# Patient Record
Sex: MALE | Race: WHITE | NOT HISPANIC OR LATINO | URBAN - METROPOLITAN AREA
[De-identification: names, ages, dates, MRNs, and addresses within clinical notes are randomized per-mention and may not be internally consistent; named-entity substitution may affect disease eponyms.]

---

## 2018-06-11 LAB — CRC RECOMMENDATION EXT: NORMAL

## 2021-04-16 ENCOUNTER — HISTORICAL (OUTPATIENT)
Dept: ADMINISTRATIVE | Facility: HOSPITAL | Age: 56
End: 2021-04-16

## 2022-04-07 ENCOUNTER — HISTORICAL (OUTPATIENT)
Dept: ADMINISTRATIVE | Facility: HOSPITAL | Age: 57
End: 2022-04-07

## 2022-04-24 VITALS
BODY MASS INDEX: 31.31 KG/M2 | SYSTOLIC BLOOD PRESSURE: 150 MMHG | OXYGEN SATURATION: 98 % | WEIGHT: 199.5 LBS | HEIGHT: 67 IN | DIASTOLIC BLOOD PRESSURE: 94 MMHG

## 2022-06-02 ENCOUNTER — TELEPHONE (OUTPATIENT)
Dept: ADMINISTRATIVE | Facility: HOSPITAL | Age: 57
End: 2022-06-02

## 2022-06-02 DIAGNOSIS — I10 HYPERTENSION, UNSPECIFIED TYPE: Primary | ICD-10-CM

## 2022-06-02 DIAGNOSIS — E11.9 TYPE 2 DIABETES MELLITUS WITHOUT COMPLICATION, WITHOUT LONG-TERM CURRENT USE OF INSULIN: ICD-10-CM

## 2022-06-02 NOTE — TELEPHONE ENCOUNTER
Tried to call patient to get more information. With mpore research from Javier, patient does have CMP, A1C and lipid panel that needs to get done before appt on 06/15/22. I just need to know hwy he needs a testosterone level needs to be added.

## 2022-06-02 NOTE — TELEPHONE ENCOUNTER
Type:  Patient Requesting Referral    Who Called:wife  Does the patient already have the specialty appointment scheduled?:no  If yes, what is the date of that appointment?:no  Referral to What Specialty:endrocrinologist   Reason for Referral:na  Does the patient want the referral with a specific physician?:Dr.Justin Mendez  Is the specialist an [facility]na  Patient Requesting a Response?:yes  Would the patient rather a call back or a response via [facility]? Call back  Best Call Back Number:[phone]  Additional Information: need lab orders placed for testosterone

## 2022-06-09 ENCOUNTER — TELEPHONE (OUTPATIENT)
Dept: PRIMARY CARE CLINIC | Facility: CLINIC | Age: 57
End: 2022-06-09

## 2022-06-09 NOTE — TELEPHONE ENCOUNTER
----- Message from Carly Tyson sent at 6/9/2022 10:45 AM CDT -----  Regarding: Referral and Labs  Patients wife left message with Call center stating she has not gotten a call from us regarding her 's testosterone results.     And also has questions about and endocrinologist referral.    [phone]

## 2022-06-10 ENCOUNTER — TELEPHONE (OUTPATIENT)
Dept: ADMINISTRATIVE | Facility: HOSPITAL | Age: 57
End: 2022-06-10

## 2022-06-15 ENCOUNTER — OFFICE VISIT (OUTPATIENT)
Dept: PRIMARY CARE CLINIC | Facility: CLINIC | Age: 57
End: 2022-06-15

## 2022-06-15 DIAGNOSIS — E78.2 MIXED HYPERLIPIDEMIA: Chronic | ICD-10-CM

## 2022-06-15 DIAGNOSIS — I10 PRIMARY HYPERTENSION: ICD-10-CM

## 2022-06-15 DIAGNOSIS — R53.82 CHRONIC FATIGUE: Chronic | ICD-10-CM

## 2022-06-15 DIAGNOSIS — R03.0 ELEVATED BLOOD PRESSURE READING WITHOUT DIAGNOSIS OF HYPERTENSION: Chronic | ICD-10-CM

## 2022-06-15 DIAGNOSIS — F32.A DEPRESSION, UNSPECIFIED DEPRESSION TYPE: ICD-10-CM

## 2022-06-15 DIAGNOSIS — E11.9 TYPE 2 DIABETES MELLITUS WITHOUT COMPLICATION, WITHOUT LONG-TERM CURRENT USE OF INSULIN: Primary | ICD-10-CM

## 2022-06-15 PROBLEM — R20.2 PARESTHESIA OF UPPER EXTREMITY: Chronic | Status: ACTIVE | Noted: 2022-06-15

## 2022-06-15 PROBLEM — J30.2 SEASONAL ALLERGIES: Status: ACTIVE | Noted: 2022-06-15

## 2022-06-15 PROBLEM — F52.0 DECREASED SEXUAL DESIRE: Status: ACTIVE | Noted: 2021-08-10

## 2022-06-15 PROBLEM — M25.512 PAIN RADIATING TO LEFT SHOULDER: Status: ACTIVE | Noted: 2022-06-15

## 2022-06-15 PROBLEM — E66.9 OBESITY: Chronic | Status: ACTIVE | Noted: 2022-06-15

## 2022-06-15 PROBLEM — R53.83 FATIGUE: Status: ACTIVE | Noted: 2021-08-10

## 2022-06-15 PROBLEM — E66.9 OBESITY: Status: ACTIVE | Noted: 2022-06-15

## 2022-06-15 PROBLEM — R94.31: Status: ACTIVE | Noted: 2022-06-15

## 2022-06-15 PROBLEM — J30.2 SEASONAL ALLERGIES: Chronic | Status: ACTIVE | Noted: 2022-06-15

## 2022-06-15 PROBLEM — R94.31: Chronic | Status: ACTIVE | Noted: 2022-06-15

## 2022-06-15 PROBLEM — M25.512 PAIN RADIATING TO LEFT SHOULDER: Chronic | Status: ACTIVE | Noted: 2022-06-15

## 2022-06-15 PROBLEM — E78.5 HYPERLIPIDEMIA: Chronic | Status: ACTIVE | Noted: 2022-06-15

## 2022-06-15 PROBLEM — R20.2 PARESTHESIA OF UPPER EXTREMITY: Status: ACTIVE | Noted: 2022-06-15

## 2022-06-15 PROBLEM — E78.5 HYPERLIPIDEMIA: Status: ACTIVE | Noted: 2022-06-15

## 2022-06-15 PROBLEM — F52.0 DECREASED SEXUAL DESIRE: Chronic | Status: ACTIVE | Noted: 2021-08-10

## 2022-06-15 PROBLEM — R53.83 FATIGUE: Chronic | Status: ACTIVE | Noted: 2021-08-10

## 2022-06-15 PROCEDURE — 1159F MED LIST DOCD IN RCRD: CPT | Mod: CPTII,95,, | Performed by: GENERAL PRACTICE

## 2022-06-15 PROCEDURE — 4010F PR ACE/ARB THEARPY RXD/TAKEN: ICD-10-PCS | Mod: CPTII,95,, | Performed by: GENERAL PRACTICE

## 2022-06-15 PROCEDURE — 1160F RVW MEDS BY RX/DR IN RCRD: CPT | Mod: CPTII,95,, | Performed by: GENERAL PRACTICE

## 2022-06-15 PROCEDURE — 1160F PR REVIEW ALL MEDS BY PRESCRIBER/CLIN PHARMACIST DOCUMENTED: ICD-10-PCS | Mod: CPTII,95,, | Performed by: GENERAL PRACTICE

## 2022-06-15 PROCEDURE — 1159F PR MEDICATION LIST DOCUMENTED IN MEDICAL RECORD: ICD-10-PCS | Mod: CPTII,95,, | Performed by: GENERAL PRACTICE

## 2022-06-15 PROCEDURE — 4010F ACE/ARB THERAPY RXD/TAKEN: CPT | Mod: CPTII,95,, | Performed by: GENERAL PRACTICE

## 2022-06-15 PROCEDURE — 99214 OFFICE O/P EST MOD 30 MIN: CPT | Mod: 95,,, | Performed by: GENERAL PRACTICE

## 2022-06-15 PROCEDURE — 99214 PR OFFICE/OUTPT VISIT, EST, LEVL IV, 30-39 MIN: ICD-10-PCS | Mod: 95,,, | Performed by: GENERAL PRACTICE

## 2022-06-15 RX ORDER — LOSARTAN POTASSIUM 50 MG/1
TABLET ORAL
COMMUNITY
Start: 2022-03-16 | End: 2022-12-13 | Stop reason: SDUPTHER

## 2022-06-15 RX ORDER — ZINC GLUCONATE 50 MG
50 TABLET ORAL DAILY
COMMUNITY
Start: 2022-03-16 | End: 2023-03-14

## 2022-06-15 RX ORDER — DESVENLAFAXINE SUCCINATE 50 MG/1
50 TABLET, EXTENDED RELEASE ORAL DAILY
Qty: 30 TABLET | Refills: 11 | Status: SHIPPED | OUTPATIENT
Start: 2022-06-15 | End: 2022-12-13

## 2022-06-15 RX ORDER — METFORMIN HYDROCHLORIDE 500 MG/1
500 TABLET ORAL DAILY
COMMUNITY
Start: 2022-06-15 | End: 2022-12-13 | Stop reason: SDUPTHER

## 2022-06-15 RX ORDER — ROSUVASTATIN CALCIUM 10 MG/1
10 TABLET, COATED ORAL NIGHTLY
COMMUNITY
Start: 2022-06-15 | End: 2022-12-13 | Stop reason: SDUPTHER

## 2022-06-15 NOTE — ASSESSMENT & PLAN NOTE
Patient is taking losartan, take blood pressures at home, record those blood pressures for evaluation later.

## 2022-06-15 NOTE — ASSESSMENT & PLAN NOTE
Patient did do a lipid panel, those results are not available, they will be retrieved and evaluated.

## 2022-06-15 NOTE — PROGRESS NOTES
Subjective:       Patient ID: Damian Ta is a 56 y.o. male.    Chief Complaint: No chief complaint on file.    HPI   Patient presents to the clinic today for chronic condition follow-up.  Doing well, no specific complaints, tolerating all medications, reporting no significant side effects or problems.  Patient discovered to be diabetic approximately 3 months ago when he had an A1c of 7.8% on a routine wellness exam.  Started on metformin then.  Also had a history of elevated blood pressure, without diagnosis of HTN, and hyperlipidemia.  Also started on rosuvastatin, with a total cholesterol of 250, triglycerides of 202, and LDL of 159. Notably, he did have blood tests done, no results are available.  He is taking losartan for his blood pressure, rosuvastatin for his cholesterol, and metformin for his diabetes.  He is having some fatigue, and only a, decreased sex drive, sometimes gets irritable for very small things.  Review of Systems   Constitutional: Negative.  Negative for fatigue and fever.   HENT: Negative.  Negative for sore throat and trouble swallowing.    Eyes: Negative.  Negative for redness and visual disturbance.   Respiratory: Negative.  Negative for chest tightness and shortness of breath.    Cardiovascular: Negative.  Negative for chest pain and palpitations.   Gastrointestinal: Negative.  Negative for abdominal pain, blood in stool and nausea.   Endocrine: Negative.  Negative for cold intolerance, heat intolerance and polyuria.   Genitourinary: Negative.    Musculoskeletal: Negative.  Negative for arthralgias, gait problem, joint swelling and neck pain.   Integumentary:  Negative for rash. Negative.   Neurological: Negative.  Negative for dizziness, weakness and headaches.   Psychiatric/Behavioral: Negative.  Negative for agitation and dysphoric mood.         Objective:   There were no vitals filed for this visit.There is no height or weight on file to calculate BMI.     Physical  Exam  Constitutional:       Appearance: Normal appearance.   Eyes:      Conjunctiva/sclera: Conjunctivae normal.   Cardiovascular:      Rate and Rhythm: Normal rate and regular rhythm.   Pulmonary:      Effort: Pulmonary effort is normal.      Breath sounds: Normal breath sounds.   Skin:     General: Skin is warm and dry.   Neurological:      Mental Status: He is alert.   Psychiatric:         Mood and Affect: Mood normal.         Behavior: Behavior normal.             Assessment:     Problem List Items Addressed This Visit        Psychiatric    Depression    Current Assessment & Plan     Start Pristiq 50 mg, sent to pharmacy, side effects discussed, will recheck this in 6 weeks.           Relevant Medications    desvenlafaxine succinate (PRISTIQ) 50 MG Tb24       Cardiac/Vascular    Hyperlipidemia (Chronic)    Current Assessment & Plan     Patient did do a lipid panel, those results are not available, they will be retrieved and evaluated.           RESOLVED: Elevated blood pressure reading without diagnosis of hypertension (Chronic)    HTN (hypertension)    Current Assessment & Plan     Patient is taking losartan, take blood pressures at home, record those blood pressures for evaluation later.              Endocrine    Type 2 diabetes mellitus without complication, without long-term current use of insulin - Primary    Current Assessment & Plan     Patient did do an A1c level, we will retrieve those results, continue metformin.           Relevant Medications    metFORMIN (GLUCOPHAGE) 500 MG tablet       Other    Fatigue (Chronic)    Current Assessment & Plan     Testosterone level will be ordered, results will be monitored.           Relevant Orders    Testosterone             Medication List with Changes/Refills   New Medications    DESVENLAFAXINE SUCCINATE (PRISTIQ) 50 MG TB24    Take 1 tablet (50 mg total) by mouth once daily.   Current Medications    LOSARTAN (COZAAR) 50 MG TABLET        METFORMIN (GLUCOPHAGE)  500 MG TABLET    Take 500 mg by mouth once daily.    ROSUVASTATIN (CRESTOR) 10 MG TABLET    Take 10 mg by mouth nightly.    ZINC GLUCONATE 50 MG TABLET    Take 50 mg by mouth once daily.     Plan:           Start Pristiq, get testosterone level done, check blood pressures regularly.  Blood test results will be monitored and reported when retrieved.  Follow up in about 6 weeks (around 7/27/2022).

## 2022-07-24 PROBLEM — E11.9 TYPE 2 DIABETES MELLITUS WITHOUT COMPLICATION, WITHOUT LONG-TERM CURRENT USE OF INSULIN: Chronic | Status: ACTIVE | Noted: 2022-06-15

## 2022-07-25 LAB — TESTOST SERPL-MCNC: 373 NG/DL (ref 264–916)

## 2022-09-05 PROBLEM — I10 PRIMARY HYPERTENSION: Chronic | Status: ACTIVE | Noted: 2022-06-15

## 2022-09-05 PROBLEM — F32.A DEPRESSION: Chronic | Status: ACTIVE | Noted: 2022-06-15

## 2022-11-07 ENCOUNTER — TELEPHONE (OUTPATIENT)
Dept: PRIMARY CARE CLINIC | Facility: CLINIC | Age: 57
End: 2022-11-07
Payer: COMMERCIAL

## 2022-11-07 NOTE — TELEPHONE ENCOUNTER
----- Message from Luciana Bryant LPN sent at 11/6/2022  1:07 PM CST -----  Regarding: FW: follow up    ----- Message -----  From: Keri Raman  Sent: 11/4/2022   2:44 PM CST  To: [facility] Lara Staff  Subject: follow up                                        Type:  Needs Medical Advice    Who Called: pt   Symptoms (please be specific):    How long has patient had these symptoms:    Pharmacy name and phone #:    Would the patient rather a call back or a response via [facility]? Call back  Best Call Back Number: [phone]  Additional Information: pt wanted to make a follow up appt.  He mention he has to cancel his past appt.  He wanted to know if he can be squeezed in for an earlier appt

## 2022-12-13 ENCOUNTER — OFFICE VISIT (OUTPATIENT)
Dept: PRIMARY CARE CLINIC | Facility: CLINIC | Age: 57
End: 2022-12-13
Payer: COMMERCIAL

## 2022-12-13 VITALS
RESPIRATION RATE: 18 BRPM | OXYGEN SATURATION: 97 % | HEART RATE: 90 BPM | BODY MASS INDEX: 31.08 KG/M2 | SYSTOLIC BLOOD PRESSURE: 148 MMHG | WEIGHT: 198 LBS | HEIGHT: 67 IN | DIASTOLIC BLOOD PRESSURE: 98 MMHG

## 2022-12-13 DIAGNOSIS — E11.9 TYPE 2 DIABETES MELLITUS WITHOUT COMPLICATION, WITHOUT LONG-TERM CURRENT USE OF INSULIN: Chronic | ICD-10-CM

## 2022-12-13 DIAGNOSIS — E78.5 DYSLIPIDEMIA: Chronic | ICD-10-CM

## 2022-12-13 DIAGNOSIS — E11.9 TYPE 2 DIABETES MELLITUS WITHOUT COMPLICATION, WITHOUT LONG-TERM CURRENT USE OF INSULIN: ICD-10-CM

## 2022-12-13 DIAGNOSIS — I10 HYPERTENSION, UNSPECIFIED TYPE: ICD-10-CM

## 2022-12-13 DIAGNOSIS — I10 PRIMARY HYPERTENSION: ICD-10-CM

## 2022-12-13 DIAGNOSIS — I10 PRIMARY HYPERTENSION: Primary | Chronic | ICD-10-CM

## 2022-12-13 DIAGNOSIS — F41.1 GAD (GENERALIZED ANXIETY DISORDER): Chronic | ICD-10-CM

## 2022-12-13 DIAGNOSIS — E78.5 DYSLIPIDEMIA: Primary | ICD-10-CM

## 2022-12-13 LAB
EST. AVERAGE GLUCOSE BLD GHB EST-MCNC: 142.7 MG/DL
HBA1C MFR BLD: 6.6 %

## 2022-12-13 PROCEDURE — 3080F PR MOST RECENT DIASTOLIC BLOOD PRESSURE >= 90 MM HG: ICD-10-PCS | Mod: CPTII,,, | Performed by: GENERAL PRACTICE

## 2022-12-13 PROCEDURE — 3077F SYST BP >= 140 MM HG: CPT | Mod: CPTII,,, | Performed by: GENERAL PRACTICE

## 2022-12-13 PROCEDURE — 1160F RVW MEDS BY RX/DR IN RCRD: CPT | Mod: CPTII,,, | Performed by: GENERAL PRACTICE

## 2022-12-13 PROCEDURE — 3080F DIAST BP >= 90 MM HG: CPT | Mod: CPTII,,, | Performed by: GENERAL PRACTICE

## 2022-12-13 PROCEDURE — 4010F PR ACE/ARB THEARPY RXD/TAKEN: ICD-10-PCS | Mod: CPTII,,, | Performed by: GENERAL PRACTICE

## 2022-12-13 PROCEDURE — 99213 OFFICE O/P EST LOW 20 MIN: CPT | Mod: ,,, | Performed by: GENERAL PRACTICE

## 2022-12-13 PROCEDURE — 3008F PR BODY MASS INDEX (BMI) DOCUMENTED: ICD-10-PCS | Mod: CPTII,,, | Performed by: GENERAL PRACTICE

## 2022-12-13 PROCEDURE — 4010F ACE/ARB THERAPY RXD/TAKEN: CPT | Mod: CPTII,,, | Performed by: GENERAL PRACTICE

## 2022-12-13 PROCEDURE — 36415 COLL VENOUS BLD VENIPUNCTURE: CPT | Performed by: GENERAL PRACTICE

## 2022-12-13 PROCEDURE — 1159F PR MEDICATION LIST DOCUMENTED IN MEDICAL RECORD: ICD-10-PCS | Mod: CPTII,,, | Performed by: GENERAL PRACTICE

## 2022-12-13 PROCEDURE — 99213 PR OFFICE/OUTPT VISIT, EST, LEVL III, 20-29 MIN: ICD-10-PCS | Mod: ,,, | Performed by: GENERAL PRACTICE

## 2022-12-13 PROCEDURE — 83036 HEMOGLOBIN GLYCOSYLATED A1C: CPT | Performed by: GENERAL PRACTICE

## 2022-12-13 PROCEDURE — 3008F BODY MASS INDEX DOCD: CPT | Mod: CPTII,,, | Performed by: GENERAL PRACTICE

## 2022-12-13 PROCEDURE — 3077F PR MOST RECENT SYSTOLIC BLOOD PRESSURE >= 140 MM HG: ICD-10-PCS | Mod: CPTII,,, | Performed by: GENERAL PRACTICE

## 2022-12-13 PROCEDURE — 36415 PR COLLECTION VENOUS BLOOD,VENIPUNCTURE: ICD-10-PCS | Mod: ,,, | Performed by: GENERAL PRACTICE

## 2022-12-13 PROCEDURE — 36415 COLL VENOUS BLD VENIPUNCTURE: CPT | Mod: ,,, | Performed by: GENERAL PRACTICE

## 2022-12-13 PROCEDURE — 1159F MED LIST DOCD IN RCRD: CPT | Mod: CPTII,,, | Performed by: GENERAL PRACTICE

## 2022-12-13 PROCEDURE — 1160F PR REVIEW ALL MEDS BY PRESCRIBER/CLIN PHARMACIST DOCUMENTED: ICD-10-PCS | Mod: CPTII,,, | Performed by: GENERAL PRACTICE

## 2022-12-13 RX ORDER — BUPROPION HYDROCHLORIDE 150 MG/1
150 TABLET ORAL DAILY
Qty: 30 TABLET | Refills: 11 | Status: SHIPPED | OUTPATIENT
Start: 2022-12-13 | End: 2022-12-13

## 2022-12-13 NOTE — PROGRESS NOTES
"Subjective:       Patient ID: Damian Ta is a 57 y.o. male.    Chief Complaint: Follow-up, Anxiety, and Hypertension    Established patient, presents for follow-up, last visit June 2022, presents for follow-up.  Still having some anxiety, could not tolerate the Pristiq, caused what felt like some urinary obstruction.  Would like to try something else, has been on Lexapro before, cause the same type of side effect.  Also, not sure about his diabetes, last A1c approximately six months ago was.  7.7%.    Review of Systems   Constitutional: Negative.  Negative for fatigue and fever.   Respiratory: Negative.  Negative for shortness of breath.    Cardiovascular: Negative.  Negative for chest pain.   Gastrointestinal: Negative.  Negative for abdominal pain, change in bowel habit and change in bowel habit.   Integumentary:  Negative.   Neurological:  Negative for weakness.       Objective:     Vitals:    12/13/22 1540   BP: (!) 148/98   Pulse: 90   Resp: 18   SpO2: 97%   Weight: 89.8 kg (198 lb)   Height: 5' 7" (1.702 m)   Body mass index is 31.01 kg/m².     Physical Exam  Constitutional:       Appearance: Normal appearance.   Eyes:      Conjunctiva/sclera: Conjunctivae normal.   Cardiovascular:      Rate and Rhythm: Normal rate and regular rhythm.   Pulmonary:      Effort: Pulmonary effort is normal.      Breath sounds: Normal breath sounds.   Skin:     General: Skin is warm and dry.   Neurological:      Mental Status: He is alert.   Psychiatric:         Mood and Affect: Mood normal.         Behavior: Behavior normal.         Assessment:     Problem List Items Addressed This Visit          Psychiatric    ESTRELLITA (generalized anxiety disorder) (Chronic)    Current Assessment & Plan     Start Wellbutrin, 150 mg daily, monitor for side effects.         Relevant Medications    buPROPion (WELLBUTRIN XL) 150 MG TB24 tablet       Cardiac/Vascular    Dyslipidemia (Chronic)    Primary hypertension - Primary (Chronic)    Current " Assessment & Plan     Not sure as to control, check blood pressures at home, we will re-evaluate at next visit.            Endocrine    Type 2 diabetes mellitus without complication, without long-term current use of insulin (Chronic)    Current Assessment & Plan     A1c will be checked today, results will be monitored and reported.         Relevant Orders    Hemoglobin A1C          Medication List with Changes/Refills   New Medications    BUPROPION (WELLBUTRIN XL) 150 MG TB24 TABLET    Take 1 tablet (150 mg total) by mouth once daily.   Current Medications    LOSARTAN (COZAAR) 50 MG TABLET        METFORMIN (GLUCOPHAGE) 500 MG TABLET    Take 500 mg by mouth once daily.    ROSUVASTATIN (CRESTOR) 10 MG TABLET    Take 10 mg by mouth nightly.    ZINC GLUCONATE 50 MG TABLET    Take 50 mg by mouth once daily.   Discontinued Medications    DESVENLAFAXINE SUCCINATE (PRISTIQ) 50 MG TB24    Take 1 tablet (50 mg total) by mouth once daily.     Plan:             Follow up in about 6 months (around 6/13/2023) for Wellness.

## 2022-12-14 RX ORDER — BUPROPION HYDROCHLORIDE 150 MG/1
150 TABLET ORAL DAILY
Qty: 30 TABLET | Refills: 11 | Status: SHIPPED | OUTPATIENT
Start: 2022-12-14 | End: 2023-03-15

## 2022-12-14 RX ORDER — ROSUVASTATIN CALCIUM 10 MG/1
10 TABLET, COATED ORAL NIGHTLY
Qty: 90 TABLET | Refills: 3 | Status: SHIPPED | OUTPATIENT
Start: 2022-12-14 | End: 2022-12-15 | Stop reason: SDUPTHER

## 2022-12-14 RX ORDER — METFORMIN HYDROCHLORIDE 500 MG/1
500 TABLET ORAL DAILY
Qty: 90 TABLET | Refills: 3 | Status: SHIPPED | OUTPATIENT
Start: 2022-12-14 | End: 2023-08-10 | Stop reason: SDUPTHER

## 2022-12-14 RX ORDER — LOSARTAN POTASSIUM 50 MG/1
50 TABLET ORAL DAILY
Qty: 90 TABLET | Refills: 3 | Status: SHIPPED | OUTPATIENT
Start: 2022-12-14 | End: 2024-01-08 | Stop reason: SDUPTHER

## 2022-12-15 DIAGNOSIS — E78.5 DYSLIPIDEMIA: Primary | Chronic | ICD-10-CM

## 2022-12-15 RX ORDER — ROSUVASTATIN CALCIUM 10 MG/1
10 TABLET, COATED ORAL DAILY
Qty: 90 TABLET | Refills: 3 | Status: SHIPPED | OUTPATIENT
Start: 2022-12-15 | End: 2024-01-08 | Stop reason: SDUPTHER

## 2022-12-27 ENCOUNTER — TELEPHONE (OUTPATIENT)
Dept: PRIMARY CARE CLINIC | Facility: CLINIC | Age: 57
End: 2022-12-27
Payer: COMMERCIAL

## 2022-12-27 DIAGNOSIS — J30.2 SEASONAL ALLERGIES: Primary | Chronic | ICD-10-CM

## 2022-12-27 RX ORDER — AZELASTINE HYDROCHLORIDE, FLUTICASONE PROPIONATE 137; 50 UG/1; UG/1
1 SPRAY, METERED NASAL 2 TIMES DAILY
Qty: 23 G | Refills: 11 | Status: SHIPPED | OUTPATIENT
Start: 2022-12-27 | End: 2022-12-30 | Stop reason: CLARIF

## 2022-12-27 RX ORDER — AZELASTINE HYDROCHLORIDE, FLUTICASONE PROPIONATE 137; 50 UG/1; UG/1
1 SPRAY, METERED NASAL 2 TIMES DAILY
COMMUNITY
End: 2022-12-27 | Stop reason: SDUPTHER

## 2022-12-27 NOTE — TELEPHONE ENCOUNTER
----- Message from Bouchra Maldonado sent at 12/27/2022  1:23 PM CST -----  Regarding: refill  Type:  RX Refill Request    Who Called: pt  Refill or New Rx:refill  RX Name and Strength:azelastine nasal spray  How is the patient currently taking it? (ex. 1XDay) 1xday  Is this a 30 day or 90 day RX:  [facility] phone number[facility]  Local or Mail Order:local  Ordering Provider:lilly breaux  Would the patient rather a call back or a response via [facility]?   Best Call Back Number:[phone]  Additional Information:

## 2022-12-30 ENCOUNTER — TELEPHONE (OUTPATIENT)
Dept: PRIMARY CARE CLINIC | Facility: CLINIC | Age: 57
End: 2022-12-30
Payer: COMMERCIAL

## 2022-12-30 RX ORDER — AZELASTINE 1 MG/ML
1 SPRAY, METERED NASAL 2 TIMES DAILY
COMMUNITY
End: 2023-01-14 | Stop reason: SDUPTHER

## 2022-12-30 NOTE — TELEPHONE ENCOUNTER
----- Message from Bouchra Maldonado sent at 12/30/2022 10:35 AM CST -----  Regarding: meds  Pt called about having the wrong nasal spray sent to the pharmacy. He used to take the azelastine [facility] .1% 137mcg ([facility] [phone]

## 2023-01-14 DIAGNOSIS — J30.2 SEASONAL ALLERGIES: Primary | Chronic | ICD-10-CM

## 2023-01-14 RX ORDER — AZELASTINE 1 MG/ML
1 SPRAY, METERED NASAL 2 TIMES DAILY
Qty: 30 ML | Refills: 11 | Status: SHIPPED | OUTPATIENT
Start: 2023-01-14 | End: 2023-01-19 | Stop reason: SDUPTHER

## 2023-01-19 ENCOUNTER — TELEPHONE (OUTPATIENT)
Dept: PRIMARY CARE CLINIC | Facility: CLINIC | Age: 58
End: 2023-01-19
Payer: COMMERCIAL

## 2023-01-19 DIAGNOSIS — J30.2 SEASONAL ALLERGIES: Chronic | ICD-10-CM

## 2023-01-19 RX ORDER — AZELASTINE 1 MG/ML
1 SPRAY, METERED NASAL 2 TIMES DAILY
Qty: 30 ML | Refills: 11 | Status: SHIPPED | OUTPATIENT
Start: 2023-01-19

## 2023-01-19 NOTE — TELEPHONE ENCOUNTER
----- Message from Constance Griffin sent at 1/19/2023  8:34 AM CST -----  Regarding: med refill  .Type:  RX Refill Request    Who Called: pt  Refill or New Rx:refill  RX Name and Strength:azelastine (ASTELIN) 137 mcg (0.1 %) nasal spray  How is the patient currently taking it? (ex. 1XDay):  Is this a 30 day or 90 day RX:  Preferred Pharmacy with phone number:[id number]  [place] - [address]   Phone: [phone]  Local or Mail Order:local  Ordering Provider:Ravin  Would the patient rather a call back or a response via [facility]? Call back  Best Call Back Number:[phone]  Additional Information: pt called to have the med refilled it was sent to the wrong pharmacy

## 2023-01-23 ENCOUNTER — PATIENT MESSAGE (OUTPATIENT)
Dept: ADMINISTRATIVE | Facility: HOSPITAL | Age: 58
End: 2023-01-23
Payer: COMMERCIAL

## 2023-02-16 ENCOUNTER — PATIENT OUTREACH (OUTPATIENT)
Dept: ADMINISTRATIVE | Facility: HOSPITAL | Age: 58
End: 2023-02-16
Payer: COMMERCIAL

## 2023-02-16 NOTE — LETTER
"       AUTHORIZATION FOR RELEASE OF   CONFIDENTIAL INFORMATION    Dear Dr. Dash,    We are seeing Damian Ta, date of birth 1965, in the clinic at [facility]. Marco Alicia MD is the patient's PCP. Damian Ta has an outstanding lab/procedure at the time we reviewed his chart. In order to help keep his health information updated, he has authorized us to request the following medical record(s):        (  )  MAMMOGRAM                                      (  xx)  COLONOSCOPY      (  )  PAP SMEAR                                          (  )  OUTSIDE LAB RESULTS     (  )  DEXA SCAN                                          (  )  EYE EXAM            (  )  FOOT EXAM                                          (  )  ENTIRE RECORD     (  )  OUTSIDE IMMUNIZATIONS                 (  )  _______________         Please fax records to [facility], Pernell J Simon, MD,  [phone]        If you have any questions, please contact Alicia Perera (Connie)Care Coordinator @ [phone]       Patient Name: Damian Ta  : 1965  Patient Phone #: [phone]     "

## 2023-02-16 NOTE — PROGRESS NOTES
Population Health Outreach.    Spoke wit pt. regarding overdue DM Eye exams, states he would like to speak with Dr. Alicia prior to having an eye exam, I will  obtain Colonoscopy from 5 years ago, per pt. he had one done with , I will request those records.

## 2023-02-22 ENCOUNTER — DOCUMENTATION ONLY (OUTPATIENT)
Dept: PRIMARY CARE CLINIC | Facility: CLINIC | Age: 58
End: 2023-02-22
Payer: COMMERCIAL

## 2023-03-13 ENCOUNTER — TELEPHONE (OUTPATIENT)
Dept: PRIMARY CARE CLINIC | Facility: CLINIC | Age: 58
End: 2023-03-13
Payer: COMMERCIAL

## 2023-03-13 NOTE — TELEPHONE ENCOUNTER
----- Message from Luciana Bryant LPN sent at 3/10/2023 10:48 AM CST -----  Regarding: FW: referral  Please contact patient. An appointment will be needed for this referral, his last ov was in 12/22. Thank You  ----- Message -----  From: Bouchra Erica  Sent: 3/10/2023  10:45 AM CST  To: [facility]avin Lara Staff  Subject: referral                                         Type:  Patient Requesting Referral    Who Called:pt  Does the patient already have the specialty appointment scheduled?:  If yes, what is the date of that appointment?:  Referral to What Specialty:urologist  Reason for Referral: frequent urination, low sex drive  Does the patient want the referral with a specific physician?:  [facility] the [facility]?:  Patient Requesting a Response?:yes  Would the patient rather a call back or a response via [facility]?   Best Call Back Number:[phone]  Additional Information: pt called about needing a referral to a urologist. He stated that he constant has the need to urinate and when he does go he never feels like he's done. He also said that he is experecign low sex drive

## 2023-03-14 PROBLEM — Z82.49 FAMILY HISTORY OF EARLY CAD: Status: ACTIVE | Noted: 2023-03-14

## 2023-03-14 PROBLEM — R35.0 URINARY FREQUENCY: Status: ACTIVE | Noted: 2023-03-14

## 2023-03-14 PROBLEM — R73.01 IMPAIRED FASTING GLUCOSE: Status: ACTIVE | Noted: 2018-01-18

## 2023-03-14 NOTE — PROGRESS NOTES
"Subjective:       Patient ID: Damian Ta is a 57 y.o. male.    Chief Complaint: Referral (Possible hernia in groin/Urinary frequency/Low sex drive)    Established patient, presents to the clinic to discuss urinary frequency and decreased sexual drive.  Notably, he does have frequency of urination, he does consume a significant amount of liquid during the daytime, but he is wondering about BPH.  Also, has decrease libido, we did check a testosterone level last year, in the mid 300 range.  Lastly, slipped a short while back, has a area in his groin that is prominent.    Review of Systems   Constitutional:  Negative for fatigue and fever.        Decreased sexual drive   HENT: Negative.  Negative for sore throat and trouble swallowing.    Eyes: Negative.  Negative for redness and visual disturbance.   Respiratory: Negative.  Negative for chest tightness and shortness of breath.    Cardiovascular: Negative.  Negative for chest pain.   Gastrointestinal: Negative.  Negative for abdominal pain, blood in stool and nausea.   Endocrine: Negative.  Negative for cold intolerance, heat intolerance and polyuria.   Genitourinary:  Positive for frequency.   Musculoskeletal: Negative.  Negative for arthralgias, gait problem and joint swelling.   Integumentary:  Negative for rash. Negative.   Neurological: Negative.  Negative for dizziness, weakness and headaches.   Psychiatric/Behavioral: Negative.  Negative for agitation and dysphoric mood.        Objective:     Vitals:    03/15/23 0820   BP: 138/88   Pulse: 77   Resp: 18   SpO2: 98%   Weight: 88.9 kg (196 lb)   Height: 5' 7" (1.702 m)   Body mass index is 30.7 kg/m².     Physical Exam  Constitutional:       Appearance: Normal appearance.   Eyes:      Conjunctiva/sclera: Conjunctivae normal.   Cardiovascular:      Rate and Rhythm: Normal rate and regular rhythm.   Pulmonary:      Effort: Pulmonary effort is normal.      Breath sounds: Normal breath sounds.   Musculoskeletal:       "   General: Normal range of motion.      Comments: Does have a prominent nontender firm area, inner thigh at the attachment of the pelvis, no herniation, not tender   Skin:     General: Skin is warm and dry.   Neurological:      Mental Status: He is alert.   Psychiatric:         Mood and Affect: Mood normal.         Behavior: Behavior normal.         Assessment:     Problem List Items Addressed This Visit          Renal/    Decreased sexual desire (Chronic)    Relevant Orders    Ambulatory referral/consult to Urology    Urinary frequency - Primary (Chronic)    Current Assessment & Plan     Patient would like to see a urologist, a referral was placed for evaluation of his urinary frequency and for his decreased sexual desire.         Relevant Orders    Ambulatory referral/consult to Urology       Orthopedic    Groin strain    Current Assessment & Plan     Does appear to have suffered a groin strain, probably some calcification or residual swelling present, no herniation, should not be a problem over time               Medication List with Changes/Refills   Current Medications    AZELASTINE (ASTELIN) 137 MCG (0.1 %) NASAL SPRAY    1 spray (137 mcg total) by Nasal route 2 (two) times daily.    LOSARTAN (COZAAR) 50 MG TABLET    Take 1 tablet (50 mg total) by mouth once daily.    METFORMIN (GLUCOPHAGE) 500 MG TABLET    Take 1 tablet (500 mg total) by mouth once daily.    ROSUVASTATIN (CRESTOR) 10 MG TABLET    Take 1 tablet (10 mg total) by mouth once daily.   Discontinued Medications    BUPROPION (WELLBUTRIN XL) 150 MG TB24 TABLET    Take 1 tablet (150 mg total) by mouth once daily.    ZINC GLUCONATE 50 MG TABLET    Take 50 mg by mouth once daily.     Plan:             Keep next scheduled follow-up.

## 2023-03-15 ENCOUNTER — OFFICE VISIT (OUTPATIENT)
Dept: PRIMARY CARE CLINIC | Facility: CLINIC | Age: 58
End: 2023-03-15
Payer: COMMERCIAL

## 2023-03-15 VITALS
OXYGEN SATURATION: 98 % | HEART RATE: 77 BPM | WEIGHT: 196 LBS | HEIGHT: 67 IN | RESPIRATION RATE: 18 BRPM | SYSTOLIC BLOOD PRESSURE: 138 MMHG | BODY MASS INDEX: 30.76 KG/M2 | DIASTOLIC BLOOD PRESSURE: 88 MMHG

## 2023-03-15 DIAGNOSIS — F52.0 DECREASED SEXUAL DESIRE: Chronic | ICD-10-CM

## 2023-03-15 DIAGNOSIS — S76.212A INGUINAL STRAIN, LEFT, INITIAL ENCOUNTER: ICD-10-CM

## 2023-03-15 DIAGNOSIS — R35.0 URINARY FREQUENCY: Primary | ICD-10-CM

## 2023-03-15 PROBLEM — S76.219A GROIN STRAIN: Status: ACTIVE | Noted: 2023-03-15

## 2023-03-15 PROCEDURE — 3075F PR MOST RECENT SYSTOLIC BLOOD PRESS GE 130-139MM HG: ICD-10-PCS | Mod: CPTII,,, | Performed by: GENERAL PRACTICE

## 2023-03-15 PROCEDURE — 3079F PR MOST RECENT DIASTOLIC BLOOD PRESSURE 80-89 MM HG: ICD-10-PCS | Mod: CPTII,,, | Performed by: GENERAL PRACTICE

## 2023-03-15 PROCEDURE — 99213 OFFICE O/P EST LOW 20 MIN: CPT | Mod: ,,, | Performed by: GENERAL PRACTICE

## 2023-03-15 PROCEDURE — 3008F BODY MASS INDEX DOCD: CPT | Mod: CPTII,,, | Performed by: GENERAL PRACTICE

## 2023-03-15 PROCEDURE — 1160F PR REVIEW ALL MEDS BY PRESCRIBER/CLIN PHARMACIST DOCUMENTED: ICD-10-PCS | Mod: CPTII,,, | Performed by: GENERAL PRACTICE

## 2023-03-15 PROCEDURE — 1159F PR MEDICATION LIST DOCUMENTED IN MEDICAL RECORD: ICD-10-PCS | Mod: CPTII,,, | Performed by: GENERAL PRACTICE

## 2023-03-15 PROCEDURE — 1159F MED LIST DOCD IN RCRD: CPT | Mod: CPTII,,, | Performed by: GENERAL PRACTICE

## 2023-03-15 PROCEDURE — 3079F DIAST BP 80-89 MM HG: CPT | Mod: CPTII,,, | Performed by: GENERAL PRACTICE

## 2023-03-15 PROCEDURE — 1160F RVW MEDS BY RX/DR IN RCRD: CPT | Mod: CPTII,,, | Performed by: GENERAL PRACTICE

## 2023-03-15 PROCEDURE — 3008F PR BODY MASS INDEX (BMI) DOCUMENTED: ICD-10-PCS | Mod: CPTII,,, | Performed by: GENERAL PRACTICE

## 2023-03-15 PROCEDURE — 4010F PR ACE/ARB THEARPY RXD/TAKEN: ICD-10-PCS | Mod: CPTII,,, | Performed by: GENERAL PRACTICE

## 2023-03-15 PROCEDURE — 4010F ACE/ARB THERAPY RXD/TAKEN: CPT | Mod: CPTII,,, | Performed by: GENERAL PRACTICE

## 2023-03-15 PROCEDURE — 3075F SYST BP GE 130 - 139MM HG: CPT | Mod: CPTII,,, | Performed by: GENERAL PRACTICE

## 2023-03-15 PROCEDURE — 99213 PR OFFICE/OUTPT VISIT, EST, LEVL III, 20-29 MIN: ICD-10-PCS | Mod: ,,, | Performed by: GENERAL PRACTICE

## 2023-03-15 NOTE — ASSESSMENT & PLAN NOTE
Patient would like to see a urologist, a referral was placed for evaluation of his urinary frequency and for his decreased sexual desire.

## 2023-03-15 NOTE — ASSESSMENT & PLAN NOTE
Does appear to have suffered a groin strain, probably some calcification or residual swelling present, no herniation, should not be a problem over time

## 2023-03-30 DIAGNOSIS — Z00.00 ENCOUNTER FOR WELLNESS EXAMINATION: Primary | ICD-10-CM

## 2023-03-30 DIAGNOSIS — Z11.59 NEED FOR HEPATITIS C SCREENING TEST: ICD-10-CM

## 2023-03-30 NOTE — PROGRESS NOTES
Pt. [facility] Outreach.   Damian has a follow up 6/2023 he will discuss DM eye screening then.

## 2023-06-06 ENCOUNTER — PATIENT OUTREACH (OUTPATIENT)
Dept: ADMINISTRATIVE | Facility: HOSPITAL | Age: 58
End: 2023-06-06
Payer: COMMERCIAL

## 2023-06-22 ENCOUNTER — TELEPHONE (OUTPATIENT)
Dept: PRIMARY CARE CLINIC | Facility: CLINIC | Age: 58
End: 2023-06-22
Payer: COMMERCIAL

## 2023-07-13 ENCOUNTER — TELEPHONE (OUTPATIENT)
Dept: PRIMARY CARE CLINIC | Facility: CLINIC | Age: 58
End: 2023-07-13
Payer: COMMERCIAL

## 2023-07-13 ENCOUNTER — DOCUMENTATION ONLY (OUTPATIENT)
Dept: PRIMARY CARE CLINIC | Facility: CLINIC | Age: 58
End: 2023-07-13
Payer: COMMERCIAL

## 2023-07-13 NOTE — TELEPHONE ENCOUNTER
Type:  Needs Medical Advice     Who Called: pts wife sara   Symptoms (please be specific):     How long has patient had these symptoms:     Pharmacy name and phone #:     Would the patient rather a call back or a response via [facility]?    Best Call Back Number: [phone]   Additional Information: molly called about needing proof that he has had a well visits since 2020 for insurance purposes

## 2023-07-13 NOTE — TELEPHONE ENCOUNTER
Last Wellness visit was 3/16/22. He was a new patient in 2021 nor 2020. His next wellness is scheduled for 8/22/23.

## 2023-07-13 NOTE — PROGRESS NOTES
Type:  Needs Medical Advice     Who Called: pts wife sara   Symptoms (please be specific):     How long has patient had these symptoms:     Pharmacy name and phone #:     Would the patient rather a call back or a response via [facility]?    Best Call Back Number: [phone]   Additional Information: molly called about needing proof that he has had a well visits since 2020 for insurance purposes

## 2023-07-24 ENCOUNTER — TELEPHONE (OUTPATIENT)
Dept: PRIMARY CARE CLINIC | Facility: CLINIC | Age: 58
End: 2023-07-24
Payer: COMMERCIAL

## 2023-07-24 NOTE — TELEPHONE ENCOUNTER
----- Message from Luciana Bryant LPN sent at 7/24/2023  8:44 AM CDT -----  Regarding: FW: advice    ----- Message -----  From: Sarah Thurston LPN  Sent: 7/24/2023   6:55 AM CDT  To: Luciana Bryant LPN  Subject: FW: advice                                         ----- Message -----  From: Bouchra Maldonado  Sent: 7/21/2023  11:33 AM CDT  To: Ravin Lara Staff  Subject: advice                                           Type:  Needs Medical Advice    Who Called: pt wife sara  Symptoms (please be specific):    How long has patient had these symptoms:    Pharmacy name and phone #:    Would the patient rather a call back or a response via [facility]?   Best Call Back Number: [phone]  Additional Information: luisradhikaal called about needing to speak to luciana about a conversation that was had a couple of days ago. She didn't give any further explanation. Please advise

## 2023-08-02 ENCOUNTER — TELEPHONE (OUTPATIENT)
Dept: PRIMARY CARE CLINIC | Facility: CLINIC | Age: 58
End: 2023-08-02
Payer: COMMERCIAL

## 2023-08-02 NOTE — TELEPHONE ENCOUNTER
----- Message from Luciana Bryant LPN sent at 8/2/2023  8:26 AM CDT -----  Regarding: FW: advice  Please contact to inform her that Blood Type is not covered under a wellness work up. If patient would like to know blood type still,  if pt donates blood they will give him that result. Patient does not have a nurse visit scheduled, if he would like to come here for his blood draw please schedule or fax to requested facility. Thank You  ----- Message -----  From: Bouchra Maldonado  Sent: 8/1/2023   4:27 PM CDT  To: Ravin Lara Staff  Subject: advice                                           Type:  Needs Medical Advice    Who Called: pts wife sara  Symptoms (please be specific):    How long has patient had these symptoms:    Pharmacy name and phone #:    Would the patient rather a call back or a response via [facility]?   Best Call Back Number: [phone]  Additional Information: stepan called about needing to talk to the nurse about pt health. She also stated that for upcoming appt she wanted to find out pt blood type and will need an order to have it tested along with other labs ordered for his wellness

## 2023-08-03 ENCOUNTER — TELEPHONE (OUTPATIENT)
Dept: PRIMARY CARE CLINIC | Facility: CLINIC | Age: 58
End: 2023-08-03
Payer: COMMERCIAL

## 2023-08-03 DIAGNOSIS — E11.9 TYPE 2 DIABETES MELLITUS WITHOUT COMPLICATION, WITHOUT LONG-TERM CURRENT USE OF INSULIN: Primary | Chronic | ICD-10-CM

## 2023-08-03 NOTE — TELEPHONE ENCOUNTER
I contacted patient's wife regarding her message. She stated that patient has been showing signs of depression, possibly PTSD from childhood. She also stated that he has been trying to fix feeling with Alcohol more now than before.  She is requesting to speak with him regarding his symptoms during his Wellness visit 8/10/23. She was also requesting a referral to Dr Roland for his DM control. She stated that he has been seeing an Urologist, he is currently being treated with testosterone injection for low testosterone. I instructed her that I will document all her concerns in his chart and discuss those with provider.

## 2023-08-03 NOTE — TELEPHONE ENCOUNTER
----- Message from Valentina Vargas sent at 8/3/2023  8:29 AM CDT -----  Regarding: call back  .Type:  Needs Medical Advice    Who Called: pt wife   Symptoms (please be specific):    How long has patient had these symptoms:    Pharmacy name and phone #:    Would the patient rather a call back or a response via [facility]?   Best Call Back Number: [phone]  Additional Information: Pt wife refused to give anymore Details

## 2023-08-07 ENCOUNTER — TELEPHONE (OUTPATIENT)
Dept: PRIMARY CARE CLINIC | Facility: CLINIC | Age: 58
End: 2023-08-07
Payer: COMMERCIAL

## 2023-08-07 NOTE — TELEPHONE ENCOUNTER
----- Message from Eden Gaitan sent at 8/7/2023  9:55 AM CDT -----  Regarding: labs  Caller is requesting to schedule their Lab appointment prior to annual appointment.  Order is not listed in EPIC.  Please enter order and contact patient to schedule.    Name of Caller:pt    Preferred Date and Time of Labs: NOW    Date of EPP Appointment: 8/10    Where would they like the lab performed? [facility] fax # [phone]    Would the patient rather a call back or a response via [facility]? C/b    Best Call Back Number:[phone]    Additional Information:pt is currently @ [facility] and has fasted, please send orders over right away pt is waiting

## 2023-08-08 LAB
ALBUMIN SERPL-MCNC: 4.7 G/DL (ref 3.8–4.9)
ALBUMIN/GLOB SERPL: 1.7 {RATIO} (ref 1.2–2.2)
ALP SERPL-CCNC: 72 IU/L (ref 44–121)
ALT SERPL-CCNC: 62 IU/L (ref 0–44)
AST SERPL-CCNC: 44 IU/L (ref 0–40)
BASOPHILS # BLD AUTO: 0.1 X10E3/UL (ref 0–0.2)
BASOPHILS NFR BLD AUTO: 1 %
BILIRUB SERPL-MCNC: 0.7 MG/DL (ref 0–1.2)
BUN SERPL-MCNC: 16 MG/DL (ref 6–24)
BUN/CREAT SERPL: 18 (ref 9–20)
CALCIUM SERPL-MCNC: 9.9 MG/DL (ref 8.7–10.2)
CHLORIDE SERPL-SCNC: 98 MMOL/L (ref 96–106)
CHOLEST SERPL-MCNC: 183 MG/DL (ref 100–199)
CO2 SERPL-SCNC: 24 MMOL/L (ref 20–29)
CREAT SERPL-MCNC: 0.9 MG/DL (ref 0.76–1.27)
EOSINOPHIL # BLD AUTO: 0.1 X10E3/UL (ref 0–0.4)
EOSINOPHIL NFR BLD AUTO: 2 %
ERYTHROCYTE [DISTWIDTH] IN BLOOD BY AUTOMATED COUNT: 12.5 % (ref 11.6–15.4)
EST. GFR  (NO RACE VARIABLE): 101 ML/MIN/1.73
GLOBULIN SER CALC-MCNC: 2.8 G/DL (ref 1.5–4.5)
GLUCOSE SERPL-MCNC: 188 MG/DL (ref 70–99)
HBA1C MFR BLD: 7.4 % (ref 4.8–5.6)
HCT VFR BLD AUTO: 47.4 % (ref 37.5–51)
HCV IGG SERPL QL IA: NON REACTIVE
HDLC SERPL-MCNC: 51 MG/DL
HGB BLD-MCNC: 16.4 G/DL (ref 13–17.7)
IMM GRANULOCYTES NFR BLD AUTO: 0 %
LDLC SERPL CALC-MCNC: 99 MG/DL (ref 0–99)
LYMPHOCYTES # BLD AUTO: 1.7 X10E3/UL (ref 0.7–3.1)
LYMPHOCYTES NFR BLD AUTO: 24 %
MCH RBC QN AUTO: 31.7 PG (ref 26.6–33)
MCHC RBC AUTO-ENTMCNC: 34.6 G/DL (ref 31.5–35.7)
MCV RBC AUTO: 92 FL (ref 79–97)
MONOCYTES # BLD AUTO: 0.7 X10E3/UL (ref 0.1–0.9)
MONOCYTES NFR BLD AUTO: 10 %
NEUTROPHILS # BLD AUTO: 4.6 X10E3/UL (ref 1.4–7)
NEUTROPHILS NFR BLD AUTO: 63 %
PLATELET # BLD AUTO: 191 X10E3/UL (ref 150–450)
POTASSIUM SERPL-SCNC: 4.5 MMOL/L (ref 3.5–5.2)
PROT SERPL-MCNC: 7.5 G/DL (ref 6–8.5)
PSA SERPL-MCNC: 2.8 NG/ML (ref 0–4)
RBC # BLD AUTO: 5.17 X10E6/UL (ref 4.14–5.8)
SODIUM SERPL-SCNC: 138 MMOL/L (ref 134–144)
SPECIMEN STATUS REPORT: NORMAL
TRIGL SERPL-MCNC: 190 MG/DL (ref 0–149)
TSH SERPL DL<=0.005 MIU/L-ACNC: 1.31 UIU/ML (ref 0.45–4.5)
VLDLC SERPL CALC-MCNC: 33 MG/DL (ref 5–40)
WBC # BLD AUTO: 7.2 X10E3/UL (ref 3.4–10.8)

## 2023-08-09 PROBLEM — D12.3 BENIGN NEOPLASM OF TRANSVERSE COLON: Status: ACTIVE | Noted: 2018-06-11

## 2023-08-09 PROBLEM — E11.65 TYPE 2 DIABETES MELLITUS WITH HYPERGLYCEMIA, WITHOUT LONG-TERM CURRENT USE OF INSULIN: Chronic | Status: ACTIVE | Noted: 2022-06-15

## 2023-08-09 PROBLEM — K21.9 GASTROESOPHAGEAL REFLUX DISEASE: Status: ACTIVE | Noted: 2019-12-11

## 2023-08-09 PROBLEM — Z86.010 PERSONAL HISTORY OF COLONIC POLYPS: Status: ACTIVE | Noted: 2023-08-09

## 2023-08-09 PROBLEM — Z86.0100 PERSONAL HISTORY OF COLONIC POLYPS: Status: ACTIVE | Noted: 2023-08-09

## 2023-08-09 PROBLEM — D12.2 BENIGN NEOPLASM OF ASCENDING COLON: Status: ACTIVE | Noted: 2018-06-11

## 2023-08-09 PROBLEM — G47.19: Status: ACTIVE | Noted: 2018-01-17

## 2023-08-09 PROBLEM — E78.5 HYPERLIPIDEMIA: Status: ACTIVE | Noted: 2017-12-05

## 2023-08-09 NOTE — PROGRESS NOTES
"Subjective:       Patient ID: Damian Ta is a 57 y.o. male.    Chief Complaint: Annual Exam    Patient presents to the clinic today for wellness examination.  Current and past medical history reviewed  Pertinent family and social history reviewed    Colorectal cancer screening:  CRC screening reviewed  Prostate CA screening:  Prostate CA screening reviewed  Vaccinations due:  All vaccinations due and/or desired have been addressed and given.    No complaints today.        Review of Systems   Constitutional: Negative.  Negative for fatigue and fever.   HENT: Negative.  Negative for sore throat and trouble swallowing.    Eyes: Negative.  Negative for redness and visual disturbance.   Respiratory: Negative.  Negative for chest tightness and shortness of breath.    Cardiovascular: Negative.  Negative for chest pain.   Gastrointestinal: Negative.  Negative for abdominal pain, blood in stool and nausea.   Endocrine: Negative.  Negative for cold intolerance, heat intolerance and polyuria.   Genitourinary: Negative.    Musculoskeletal: Negative.  Negative for arthralgias, gait problem and joint swelling.   Integumentary:  Negative for rash. Negative.   Neurological: Negative.  Negative for dizziness, weakness and headaches.   Psychiatric/Behavioral: Negative.  Negative for agitation and dysphoric mood.          Objective:   Visit Vitals  /85   Pulse 80   Resp 18   Ht 5' 7" (1.702 m)   Wt 88 kg (194 lb)   SpO2 99%   BMI 30.38 kg/m²        Physical Exam  Constitutional:       Appearance: He is obese.   HENT:      Head: Normocephalic.      Mouth/Throat:      Mouth: Mucous membranes are moist.      Pharynx: Oropharynx is clear.   Eyes:      Pupils: Pupils are equal, round, and reactive to light.   Cardiovascular:      Rate and Rhythm: Normal rate and regular rhythm.   Pulmonary:      Effort: Pulmonary effort is normal.      Breath sounds: Normal breath sounds.   Abdominal:      Palpations: Abdomen is soft. "   Musculoskeletal:         General: Normal range of motion.   Skin:     General: Skin is warm and dry.   Neurological:      General: No focal deficit present.      Mental Status: He is alert.   Psychiatric:         Mood and Affect: Mood normal.         Behavior: Behavior normal.         Thought Content: Thought content normal.         Judgment: Judgment normal.           Lab Results   Component Value Date     (H) 08/07/2023     08/07/2023    K 4.5 08/07/2023    CL 98 08/07/2023    CO2 24 08/07/2023    BUN 16 08/07/2023    CREATININE 0.90 08/07/2023    CALCIUM 9.9 08/07/2023    ALBUMIN 4.7 08/07/2023    BILITOT 0.7 08/07/2023    AST 44 (H) 08/07/2023    ALT 62 (H) 08/07/2023    EGFRNORACEVR 101 08/07/2023     Lab Results   Component Value Date    WBC 7.2 08/07/2023    RBC 5.17 08/07/2023    HGB 16.4 08/07/2023    HCT 47.4 08/07/2023    MCV 92 08/07/2023    RDW 12.5 08/07/2023     08/07/2023      Lab Results   Component Value Date    CHOL 183 08/07/2023    TRIG 190 (H) 08/07/2023    HDL 51 08/07/2023     Lab Results   Component Value Date    TSH 1.310 08/07/2023     Lab Results   Component Value Date    HGBA1C 7.4 (H) 08/07/2023      Component Ref Range & Units 2 d ago   PSA - [facility] 0.0 - 4.0 ng/mL 2.8        Assessment:     Problem List Items Addressed This Visit          Cardiac/Vascular    Dyslipidemia (Chronic)    Current Assessment & Plan     Prescribed Crestor 10 mg daily.      Component Ref Range & Units 2 d ago   Cholesterol 100 - 199 mg/dL 183    Triglycerides 0 - 149 mg/dL 190 High     HDL >39 mg/dL 51    VLDL Cholesterol Deejay 5 - 40 mg/dL 33    LDL Calculated 0 - 99 mg/dL 99      Most recent cholesterol/lipid blood testing shows adequate control, continue current treatment plan, including prescription medications if prescribed, and/or diet high in fiber, low in saturated fats as discussed during clinic visit.           Primary hypertension (Chronic)    Current Assessment & Plan      Prescribed losartan 50 mg daily.  Blood pressures appear to be adequately controlled with current treatment plan, including prescription blood pressure medication. Continue medical management and continue home blood pressure checks.  Blood pressure should be checked as discussed during medical visits, and average blood pressure should be no greater than 130/80.         Family history of early CAD (Chronic)    Current Assessment & Plan     We will continue to monitor all modifiable risk factors to reduce overall cardiac risk.            Endocrine    Type 2 diabetes mellitus without complication, without long-term current use of insulin (Chronic)    Current Assessment & Plan     Prescribed metformin 500 mg daily.     Component Ref Range & Units 2 d ago 7 mo ago   Hemoglobin A1c 4.8 - 5.6 % 7.4 High   6.6 R      Most recent hemoglobin A1c shows good control on diabetic treatment plan, including diabetic prescription medication.  Continue current treatment plan, medical regimen, and lifestyle modification as discussed at this visit and during previous visits.             Relevant Medications    metFORMIN (GLUCOPHAGE) 500 MG tablet    Other Relevant Orders    Hemoglobin A1C    Microalbumin/Creatinine Ratio, Urine    Low testosterone (Chronic)    Current Assessment & Plan     Patient is seeing Dr. Rush Aviles for testosterone replacement therapy.          Other Visit Diagnoses       Wellness examination    -  Primary    Overall health status was reviewed.  Good health habits reinforced.  Annual wellness exams recommended.    Screening for prostate cancer        Prostate specific antigen blood test obtained was essentially normal, suggesting that there is no current concern for prostate cancer.  Digital exam deferred.               Current Outpatient Medications   Medication Instructions    azelastine (ASTELIN) 137 mcg, Nasal, 2 times daily    losartan (COZAAR) 50 mg, Oral, Daily    metFORMIN (GLUCOPHAGE) 500 mg, Oral, 2  times daily with meals    rosuvastatin (CRESTOR) 10 mg, Oral, Daily    sodium,potassium,mag sulfates (SUPREP BOWEL PREP KIT) 17.5-3.13-1.6 gram SolR 1 kit, Oral    tadalafiL (CIALIS) 5 mg, Oral    testosterone cypionate (DEPOTESTOTERONE CYPIONATE) 200 mg/mL injection Intramuscular, Every 28 days     Plan:             Follow up in about 27 weeks (around 2/15/2024) for Chronic condition/DM  F/up.

## 2023-08-09 NOTE — ASSESSMENT & PLAN NOTE
Prescribed Crestor 10 mg daily.      Component Ref Range & Units 2 d ago   Cholesterol 100 - 199 mg/dL 183    Triglycerides 0 - 149 mg/dL 190 High     HDL >39 mg/dL 51    VLDL Cholesterol Deejay 5 - 40 mg/dL 33    LDL Calculated 0 - 99 mg/dL 99      Most recent cholesterol/lipid blood testing shows adequate control, continue current treatment plan, including prescription medications if prescribed, and/or diet high in fiber, low in saturated fats as discussed during clinic visit.

## 2023-08-09 NOTE — ASSESSMENT & PLAN NOTE
Prescribed metformin 500 mg daily.     Component Ref Range & Units 2 d ago 7 mo ago   Hemoglobin A1c 4.8 - 5.6 % 7.4 High   6.6 R      Most recent hemoglobin A1c shows good control on diabetic treatment plan, including diabetic prescription medication.  Continue current treatment plan, medical regimen, and lifestyle modification as discussed at this visit and during previous visits.

## 2023-08-10 ENCOUNTER — TELEPHONE (OUTPATIENT)
Dept: PRIMARY CARE CLINIC | Facility: CLINIC | Age: 58
End: 2023-08-10

## 2023-08-10 ENCOUNTER — OFFICE VISIT (OUTPATIENT)
Dept: PRIMARY CARE CLINIC | Facility: CLINIC | Age: 58
End: 2023-08-10
Payer: COMMERCIAL

## 2023-08-10 VITALS
HEIGHT: 67 IN | OXYGEN SATURATION: 99 % | SYSTOLIC BLOOD PRESSURE: 133 MMHG | DIASTOLIC BLOOD PRESSURE: 85 MMHG | WEIGHT: 194 LBS | HEART RATE: 80 BPM | BODY MASS INDEX: 30.45 KG/M2 | RESPIRATION RATE: 18 BRPM

## 2023-08-10 DIAGNOSIS — R79.89 LOW TESTOSTERONE: Chronic | ICD-10-CM

## 2023-08-10 DIAGNOSIS — E11.9 TYPE 2 DIABETES MELLITUS WITHOUT COMPLICATION, WITHOUT LONG-TERM CURRENT USE OF INSULIN: Chronic | ICD-10-CM

## 2023-08-10 DIAGNOSIS — Z00.00 WELLNESS EXAMINATION: Primary | ICD-10-CM

## 2023-08-10 DIAGNOSIS — Z12.5 SCREENING FOR PROSTATE CANCER: ICD-10-CM

## 2023-08-10 DIAGNOSIS — N52.9 ERECTILE DYSFUNCTION, UNSPECIFIED ERECTILE DYSFUNCTION TYPE: Primary | ICD-10-CM

## 2023-08-10 DIAGNOSIS — Z82.49 FAMILY HISTORY OF EARLY CAD: Chronic | ICD-10-CM

## 2023-08-10 DIAGNOSIS — E78.5 DYSLIPIDEMIA: Chronic | ICD-10-CM

## 2023-08-10 DIAGNOSIS — I10 PRIMARY HYPERTENSION: Chronic | ICD-10-CM

## 2023-08-10 PROBLEM — E66.811 CLASS 1 OBESITY DUE TO EXCESS CALORIES WITH SERIOUS COMORBIDITY AND BODY MASS INDEX (BMI) OF 30.0 TO 30.9 IN ADULT: Chronic | Status: ACTIVE | Noted: 2022-06-15

## 2023-08-10 PROBLEM — R73.01 IMPAIRED FASTING GLUCOSE: Status: RESOLVED | Noted: 2018-01-18 | Resolved: 2023-08-10

## 2023-08-10 PROBLEM — E66.09 CLASS 1 OBESITY DUE TO EXCESS CALORIES WITH SERIOUS COMORBIDITY AND BODY MASS INDEX (BMI) OF 30.0 TO 30.9 IN ADULT: Chronic | Status: ACTIVE | Noted: 2022-06-15

## 2023-08-10 PROBLEM — S76.219A GROIN STRAIN: Status: RESOLVED | Noted: 2023-03-15 | Resolved: 2023-08-10

## 2023-08-10 PROCEDURE — 1160F PR REVIEW ALL MEDS BY PRESCRIBER/CLIN PHARMACIST DOCUMENTED: ICD-10-PCS | Mod: CPTII,,, | Performed by: GENERAL PRACTICE

## 2023-08-10 PROCEDURE — 99396 PREV VISIT EST AGE 40-64: CPT | Mod: ,,, | Performed by: GENERAL PRACTICE

## 2023-08-10 PROCEDURE — 1159F MED LIST DOCD IN RCRD: CPT | Mod: CPTII,,, | Performed by: GENERAL PRACTICE

## 2023-08-10 PROCEDURE — 3079F PR MOST RECENT DIASTOLIC BLOOD PRESSURE 80-89 MM HG: ICD-10-PCS | Mod: CPTII,,, | Performed by: GENERAL PRACTICE

## 2023-08-10 PROCEDURE — 3051F HG A1C>EQUAL 7.0%<8.0%: CPT | Mod: CPTII,,, | Performed by: GENERAL PRACTICE

## 2023-08-10 PROCEDURE — 3079F DIAST BP 80-89 MM HG: CPT | Mod: CPTII,,, | Performed by: GENERAL PRACTICE

## 2023-08-10 PROCEDURE — 4010F PR ACE/ARB THEARPY RXD/TAKEN: ICD-10-PCS | Mod: CPTII,,, | Performed by: GENERAL PRACTICE

## 2023-08-10 PROCEDURE — 3075F SYST BP GE 130 - 139MM HG: CPT | Mod: CPTII,,, | Performed by: GENERAL PRACTICE

## 2023-08-10 PROCEDURE — 99396 PR PREVENTIVE VISIT,EST,40-64: ICD-10-PCS | Mod: ,,, | Performed by: GENERAL PRACTICE

## 2023-08-10 PROCEDURE — 1160F RVW MEDS BY RX/DR IN RCRD: CPT | Mod: CPTII,,, | Performed by: GENERAL PRACTICE

## 2023-08-10 PROCEDURE — 4010F ACE/ARB THERAPY RXD/TAKEN: CPT | Mod: CPTII,,, | Performed by: GENERAL PRACTICE

## 2023-08-10 PROCEDURE — 3051F PR MOST RECENT HEMOGLOBIN A1C LEVEL 7.0 - < 8.0%: ICD-10-PCS | Mod: CPTII,,, | Performed by: GENERAL PRACTICE

## 2023-08-10 PROCEDURE — 3008F BODY MASS INDEX DOCD: CPT | Mod: CPTII,,, | Performed by: GENERAL PRACTICE

## 2023-08-10 PROCEDURE — 1159F PR MEDICATION LIST DOCUMENTED IN MEDICAL RECORD: ICD-10-PCS | Mod: CPTII,,, | Performed by: GENERAL PRACTICE

## 2023-08-10 PROCEDURE — 3008F PR BODY MASS INDEX (BMI) DOCUMENTED: ICD-10-PCS | Mod: CPTII,,, | Performed by: GENERAL PRACTICE

## 2023-08-10 PROCEDURE — 3075F PR MOST RECENT SYSTOLIC BLOOD PRESS GE 130-139MM HG: ICD-10-PCS | Mod: CPTII,,, | Performed by: GENERAL PRACTICE

## 2023-08-10 RX ORDER — TADALAFIL 5 MG/1
5 TABLET ORAL DAILY PRN
Qty: 30 TABLET | Refills: 2 | Status: SHIPPED | OUTPATIENT
Start: 2023-08-10

## 2023-08-10 RX ORDER — METFORMIN HYDROCHLORIDE 500 MG/1
500 TABLET ORAL 2 TIMES DAILY WITH MEALS
Qty: 180 TABLET | Refills: 3 | Status: SHIPPED | OUTPATIENT
Start: 2023-08-10 | End: 2023-11-09

## 2023-08-10 RX ORDER — SODIUM, POTASSIUM,MAG SULFATES 17.5-3.13G
1 SOLUTION, RECONSTITUTED, ORAL ORAL
COMMUNITY
Start: 2023-06-27

## 2023-08-10 RX ORDER — TESTOSTERONE CYPIONATE 200 MG/ML
INJECTION, SOLUTION INTRAMUSCULAR
COMMUNITY

## 2023-08-10 RX ORDER — TADALAFIL 5 MG/1
5 TABLET ORAL
COMMUNITY
Start: 2023-07-11 | End: 2023-08-10 | Stop reason: SDUPTHER

## 2023-08-10 NOTE — TELEPHONE ENCOUNTER
I contacted pt's wife Alta regarding message. 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 His mother recently passed away.

## 2023-08-10 NOTE — TELEPHONE ENCOUNTER
----- Message from Harris Mcadams sent at 8/9/2023 10:06 AM CDT -----  .Type:  Needs Medical Advice    Who Called: Alta, patient's wife   Symptoms (please be specific):    How long has patient had these symptoms:    Pharmacy name and phone #:    Would the patient rather a call back or a response via [facility]?   Best Call Back Number: [phone]  Additional Information: Requested to speak with Ms. Griggs about her .  Needs a call back as soon as possible she told me.

## 2023-08-11 ENCOUNTER — TELEPHONE (OUTPATIENT)
Dept: PRIMARY CARE CLINIC | Facility: CLINIC | Age: 58
End: 2023-08-11
Payer: COMMERCIAL

## 2023-08-11 NOTE — TELEPHONE ENCOUNTER
----- Message from Constance Griffin sent at 8/11/2023  8:27 AM CDT -----  Regarding: call back  .Type:  Needs Medical Advice    Who Called: pt wife  Symptoms (please be specific):    How long has patient had these symptoms:    Pharmacy name and phone #:    Would the patient rather a call back or a response via [facility]? Call back  Best Call Back Number: [phone]  Additional Information: pt wife is requesting a call back from Luciana

## 2023-08-17 ENCOUNTER — TELEPHONE (OUTPATIENT)
Dept: PRIMARY CARE CLINIC | Facility: CLINIC | Age: 58
End: 2023-08-17
Payer: COMMERCIAL

## 2023-08-17 NOTE — TELEPHONE ENCOUNTER
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

## 2023-08-17 NOTE — TELEPHONE ENCOUNTER
----- Message from Bouchra Maldonado sent at 8/17/2023  8:15 AM CDT -----  Regarding: advice  Type:  Needs Medical Advice    Who Called: pt  Symptoms (please be specific):  How long has patient had these symptoms:    Pharmacy name and phone #:    Would the patient rather a call back or a response via [facility]?   Best Call Back Number: [phone]  Additional Information: pt called about needing a cdl physical done and would like to talk to the nurse about it. Please advise

## 2023-08-29 ENCOUNTER — PATIENT MESSAGE (OUTPATIENT)
Dept: ADMINISTRATIVE | Facility: HOSPITAL | Age: 58
End: 2023-08-29
Payer: COMMERCIAL

## 2023-09-21 ENCOUNTER — TELEPHONE (OUTPATIENT)
Dept: PRIMARY CARE CLINIC | Facility: CLINIC | Age: 58
End: 2023-09-21
Payer: COMMERCIAL

## 2023-09-21 LAB — HBA1C MFR BLD: 6.2 %

## 2023-09-21 NOTE — TELEPHONE ENCOUNTER
----- Message from Valentina Vargas sent at 9/21/2023  3:01 PM CDT -----  Regarding: update  .Type:  Needs Medical Advice    Who Called: pt    Best Call Back Number: [phone]  Additional Information: A1C 6.2

## 2023-09-21 NOTE — TELEPHONE ENCOUNTER
----- Message from Valentina Vargas sent at 9/21/2023  3:01 PM CDT -----  Regarding: update  .Type:  Needs Medical Advice    Who Called: pt    Best Call Back Number: [phone]  Additional Information: A1C 6.2

## 2023-09-22 ENCOUNTER — TELEPHONE (OUTPATIENT)
Dept: PRIMARY CARE CLINIC | Facility: CLINIC | Age: 58
End: 2023-09-22
Payer: COMMERCIAL

## 2023-09-22 NOTE — TELEPHONE ENCOUNTER
----- Message from Luciana Bryant LPN sent at 9/22/2023  8:07 AM CDT -----  Regarding: FW: Patient Call  Please contact patient regarding his A1C. Please find out where he had his lab drawn to get a copy of the report Thanks  ----- Message -----  From: Shea Coronel  Sent: 9/21/2023   4:37 PM CDT  To: Ravin Lara Staff  Subject: Patient Call                                     .Type:  Patient Returning Call    Who Called:pt  Who Left Message for Patient:office staff  Does the patient know what this is regarding?:missed call   Would the patient rather a call back or a response via [facility]?   Best Call Back Number:[phone]  Additional Information: missed call

## 2023-11-09 ENCOUNTER — TELEPHONE (OUTPATIENT)
Dept: PRIMARY CARE CLINIC | Facility: CLINIC | Age: 58
End: 2023-11-09
Payer: COMMERCIAL

## 2023-11-09 ENCOUNTER — PATIENT MESSAGE (OUTPATIENT)
Dept: PRIMARY CARE CLINIC | Facility: CLINIC | Age: 58
End: 2023-11-09
Payer: COMMERCIAL

## 2023-11-09 DIAGNOSIS — E11.9 TYPE 2 DIABETES MELLITUS WITHOUT COMPLICATION, WITHOUT LONG-TERM CURRENT USE OF INSULIN: Primary | Chronic | ICD-10-CM

## 2023-11-09 RX ORDER — METFORMIN HYDROCHLORIDE 500 MG/1
500 TABLET, EXTENDED RELEASE ORAL
Qty: 90 TABLET | Refills: 3 | Status: SHIPPED | OUTPATIENT
Start: 2023-11-09 | End: 2024-02-19 | Stop reason: SDUPTHER

## 2023-11-09 NOTE — TELEPHONE ENCOUNTER
Pt called requested Metformin 500mg to be changed to ER. Pt stated he has been having diarrhea and wanted to see if he could change to extended release.

## 2023-11-09 NOTE — TELEPHONE ENCOUNTER
----- Message from Luciana Bryant LPN sent at 11/9/2023  1:07 PM CST -----  Regarding: FW: advice    ----- Message -----  From: Bouchra Maldonado  Sent: 11/9/2023   1:03 PM CST  To: [facility] Staff  Subject: advice                                           Type:  Needs Medical Advice    Who Called: pt  Symptoms (please be specific):    How long has patient had these symptoms:    Pharmacy name and phone #:    Would the patient rather a call back or a response via [facility]?   Best Call Back Number: [phone]  Additional Information: pt stated that he is needing to have metFORMIN (GLUCOPHAGE) 500 MG tablet changed to the extended release. Pt requested a call back from brianne. Please advise

## 2023-11-20 ENCOUNTER — TELEPHONE (OUTPATIENT)
Dept: PRIMARY CARE CLINIC | Facility: CLINIC | Age: 58
End: 2023-11-20
Payer: COMMERCIAL

## 2023-11-20 NOTE — TELEPHONE ENCOUNTER
----- Message from Sarah Thurston LPN sent at 11/20/2023 12:36 PM CST -----  Regarding: FW: advice    ----- Message -----  From: Bouchra Maldonado  Sent: 11/20/2023  11:30 AM CST  To: [facility] Staff  Subject: advice                                           Type:  Needs Medical Advice    Who Called: pt  Symptoms (please be specific):    How long has patient had these symptoms:    Pharmacy name and phone #:    Would the patient rather a call back or a response via [facility]?   Best Call Back Number: [phone]  Additional Information: pt called about needing to speak to brianne. Please advice

## 2023-11-20 NOTE — TELEPHONE ENCOUNTER
Called pt and he stated he is not feeling. Pt has fever, chills, head congestions, nasal congestion. Pt has been taking OTC tylenol to help with the fever. Advised pt that  is not in the office and told to go to [facility] to be tested for flu or covid. Pt stated he will go to [facility] and told to call if not any better. Pt vocalized understanding and had no questions or concerns,

## 2024-01-08 DIAGNOSIS — I10 PRIMARY HYPERTENSION: ICD-10-CM

## 2024-01-08 DIAGNOSIS — E78.5 DYSLIPIDEMIA: Chronic | ICD-10-CM

## 2024-01-08 RX ORDER — LOSARTAN POTASSIUM 50 MG/1
50 TABLET ORAL DAILY
Qty: 90 TABLET | Refills: 3 | Status: SHIPPED | OUTPATIENT
Start: 2024-01-08

## 2024-01-08 RX ORDER — ROSUVASTATIN CALCIUM 10 MG/1
10 TABLET, COATED ORAL DAILY
Qty: 90 TABLET | Refills: 3 | Status: SHIPPED | OUTPATIENT
Start: 2024-01-08 | End: 2025-01-07

## 2024-01-09 ENCOUNTER — DOCUMENTATION ONLY (OUTPATIENT)
Dept: PRIMARY CARE CLINIC | Facility: CLINIC | Age: 59
End: 2024-01-09
Payer: COMMERCIAL

## 2024-02-14 ENCOUNTER — TELEPHONE (OUTPATIENT)
Dept: PRIMARY CARE CLINIC | Facility: CLINIC | Age: 59
End: 2024-02-14
Payer: COMMERCIAL

## 2024-02-14 NOTE — TELEPHONE ENCOUNTER
----- Message from Samra Sam sent at 2/14/2024 11:38 AM CST -----  Regarding: return call  .Type:  Patient Returning Call    Who Called:Pt    Who Left Message for Patient:Nishi    Does the patient know what this is regarding?:n/a    Would the patient rather a call back or a response via [facility]?     Best Call Back Number:[phone]    Additional Information: Pt called requesting to speak w/ Nishi; please advise. Thanks.

## 2024-02-19 ENCOUNTER — TELEPHONE (OUTPATIENT)
Dept: PRIMARY CARE CLINIC | Facility: CLINIC | Age: 59
End: 2024-02-19

## 2024-02-19 DIAGNOSIS — E11.9 TYPE 2 DIABETES MELLITUS WITHOUT COMPLICATION, WITHOUT LONG-TERM CURRENT USE OF INSULIN: Primary | Chronic | ICD-10-CM

## 2024-02-19 RX ORDER — METFORMIN HYDROCHLORIDE 500 MG/1
500 TABLET, EXTENDED RELEASE ORAL
Qty: 90 TABLET | Refills: 3 | Status: SHIPPED | OUTPATIENT
Start: 2024-02-19 | End: 2025-02-18

## 2024-02-19 NOTE — TELEPHONE ENCOUNTER
----- Message from Rogelio Zayas sent at 2/19/2024  8:47 AM CST -----  Type:  Patient Returning Call    Who Called:pt   Who Left Message for Patient:Nishi    Best Call Back Number: [phone]  Additional Information: pt returning missed call to Nishi

## 2024-02-29 ENCOUNTER — TELEPHONE (OUTPATIENT)
Dept: PRIMARY CARE CLINIC | Facility: CLINIC | Age: 59
End: 2024-02-29

## 2024-02-29 NOTE — TELEPHONE ENCOUNTER
----- Message from Iliana April sent at 2/29/2024  2:07 PM CST -----  Regarding: Call Back  Type:  Needs Medical Advice    Who Called: Damian        Would the patient rather a call back or a response via [facility]? Call back     Best Call Back Number: [phone]    Additional Information: pt wanted ismael to call him back, did not want to say why

## 2024-03-16 NOTE — ASSESSMENT & PLAN NOTE
Component Ref Range & Units 5 mo ago 7 mo ago 1 yr ago   Hemoglobin A1C % 6.2 7.4 High  R, CM 6.6 R

## 2024-03-16 NOTE — PROGRESS NOTES
"Subjective:       Patient ID: Damian Ta is a 58 y.o. male.    Chief Complaint: Follow-up    Patient presents to the clinic today for chronic condition follow-up.  Doing well, no specific complaints, tolerating all medications, reporting no significant side effects or problems.    Last wellness exam was     Chronic conditions being treated include but are not limited to primary hypertension, diabetes mellitus, dyslipidemia.      Review of Systems   Constitutional: Negative.  Negative for fatigue and fever.   HENT: Negative.  Negative for sore throat and trouble swallowing.    Eyes: Negative.  Negative for redness and visual disturbance.   Respiratory: Negative.  Negative for chest tightness and shortness of breath.    Cardiovascular: Negative.  Negative for chest pain.   Gastrointestinal: Negative.  Negative for abdominal pain, blood in stool and nausea.   Endocrine: Negative.  Negative for cold intolerance, heat intolerance and polyuria.   Genitourinary: Negative.    Musculoskeletal: Negative.  Negative for arthralgias, gait problem and joint swelling.   Integumentary:  Negative for rash. Negative.   Neurological: Negative.  Negative for dizziness, weakness and headaches.   Psychiatric/Behavioral: Negative.  Negative for agitation and dysphoric mood.            Patient Care Team:  Marco Alicia MD as PCP - General (Family Medicine)  Alicia Perera LPN as Care Coordinator  Duane Dash MD as Consulting Physician (Gastroenterology)  Garett Aviles MD as Consulting Physician (Urology)  Objective:   Visit Vitals  /88   Pulse 66   Ht 5' 7" (1.702 m)   Wt 86.4 kg (190 lb 6.4 oz)   BMI 29.82 kg/m²        Physical Exam  Constitutional:       Appearance: He is obese.   HENT:      Head: Normocephalic.      Mouth/Throat:      Mouth: Mucous membranes are moist.      Pharynx: Oropharynx is clear.   Eyes:      Pupils: Pupils are equal, round, and reactive to light.   Cardiovascular:      Rate and Rhythm: " Normal rate and regular rhythm.   Pulmonary:      Effort: Pulmonary effort is normal.      Breath sounds: Normal breath sounds.   Abdominal:      Palpations: Abdomen is soft.   Musculoskeletal:         General: Normal range of motion.   Skin:     General: Skin is warm and dry.   Neurological:      General: No focal deficit present.      Mental Status: He is alert.   Psychiatric:         Mood and Affect: Mood normal.         Behavior: Behavior normal.         Thought Content: Thought content normal.         Judgment: Judgment normal.           Lab Results   Component Value Date     (H) 08/07/2023     08/07/2023    K 4.5 08/07/2023    CL 98 08/07/2023    CO2 24 08/07/2023    BUN 16 08/07/2023    CREATININE 0.90 08/07/2023    CALCIUM 9.9 08/07/2023    ALBUMIN 4.7 08/07/2023    BILITOT 0.7 08/07/2023    AST 44 (H) 08/07/2023    ALT 62 (H) 08/07/2023    EGFRNORACEVR 101 08/07/2023     Lab Results   Component Value Date    WBC 7.2 08/07/2023    RBC 5.17 08/07/2023    HGB 16.4 08/07/2023    HCT 47.4 08/07/2023    MCV 92 08/07/2023    RDW 12.5 08/07/2023     08/07/2023      Lab Results   Component Value Date    CHOL 183 08/07/2023    TRIG 190 (H) 08/07/2023    HDL 51 08/07/2023     Lab Results   Component Value Date    TSH 1.310 08/07/2023     Lab Results   Component Value Date    HGBA1C 6.2 09/21/2023      Assessment:       ICD-10-CM ICD-9-CM   1. Primary hypertension  I10 401.9   2. Type 2 diabetes mellitus without complication, without long-term current use of insulin  E11.9 250.00   3. Dyslipidemia  E78.5 272.4   4. Family history of early CAD  Z82.49 V17.3   5. Seasonal allergies  J30.2 477.9   6. Class 1 obesity due to excess calories with serious comorbidity and body mass index (BMI) of 30.0 to 30.9 in adult  E66.09 278.00    Z68.30 V85.30   7. Screening for prostate cancer  Z12.5 V76.44       Current Outpatient Medications   Medication Instructions    azelastine (ASTELIN) 137 mcg, Nasal, 2 times  daily    losartan (COZAAR) 50 mg, Oral, Daily    metFORMIN (GLUCOPHAGE-XR) 500 mg, Oral, With breakfast    MOUNJARO 2.5 mg, Subcutaneous, Every 7 days    rosuvastatin (CRESTOR) 10 mg, Oral, Daily    sodium,potassium,mag sulfates (SUPREP BOWEL PREP KIT) 17.5-3.13-1.6 gram SolR 1 kit, Oral    tadalafiL (CIALIS) 5 mg, Oral, Daily PRN    testosterone cypionate (DEPOTESTOTERONE CYPIONATE) 200 mg/mL injection Intramuscular, Every 28 days     Plan:     Problem List Items Addressed This Visit          ENT    Seasonal allergies (Chronic)    Overview     Prescribed azelastine nasal spray.    Advised to start or continue Zyrtec or Allegra daily for allergy symptoms, if no hypertension, switch to Zyrtec-D he has is or Allegra-D for more significant allergy symptoms.  Add over-the-counter cortisone nasal spray such as Flonase or Nasacort if desired.  Always consume adequate fluids to keep mucus from drying out.  Watch for signs of secondary infection, particularly bacterial sinusitis as discussed during clinic visit.  Otherwise, contact this clinic for any further problems.            Cardiac/Vascular    Dyslipidemia (Chronic)    Overview     Prescribed Crestor 10 mg daily.    Most recent cholesterol/lipid blood testing shows adequate control, continue current treatment plan, including prescription medications if prescribed, and/or diet high in fiber, low in saturated fats as discussed during clinic visit.         Relevant Orders    Lipid Panel    Primary hypertension - Primary (Chronic)    Overview     Prescribe losartan 50 mg daily.    Blood pressures appear to be adequately controlled with current treatment plan, including prescription blood pressure medication. Continue medical management and continue home blood pressure checks.  Blood pressure should be checked as discussed during medical visits, and average blood pressure should be no greater than 130/80.         Relevant Orders    Comprehensive Metabolic Panel    CBC Auto  "Differential    TSH    Family history of early CAD (Chronic)    Overview     Cardiovascular risk factors that have been addressed are as follows:  Hypertension, dyslipidemia, diabetes mellitus.            Endocrine    Class 1 obesity due to excess calories with serious comorbidity and body mass index (BMI) of 30.0 to 30.9 in adult (Chronic)    Overview     Weight loss, healthy dietary choices, increased activity/exercise are recommended.  To lose weight, it is generally recommended to restrict calories to approximately 1500 calories per day to lose 1 lb per week, and approximately 1200 calories per day to lose up to 2 lb per week.  Generally, this is a healthy amount of weight to lose, and an appropriate amount of time to lose this amount of weight.  Adding exercise will assist in losing weight, particularly aerobic exercise such as walking.  However, resistance training, or lifting weights" over time may assistant weight loss by increasing basal metabolic rate, or the rate at which your body burns calories during periods of inactivity.    Keep track of BMI (body mass index), below 25 is considered normal, over 25 but below 30 is considered overweight, anything over 30 is considered moderately obese, over 35 severely obese, and over 40 is characterized as morbidly obese.         Type 2 diabetes mellitus without complication, without long-term current use of insulin (Chronic)    Overview     Prescribed metformin  mg daily, Mounjaro 2.5 mg weekly.    Mounjaro started on 03/18/2024.  Current A1c pending.    Most recent hemoglobin A1c shows good control on diabetic treatment plan, including diabetic prescription medication.  Continue current treatment plan, medical regimen, and lifestyle modification as discussed at this visit and during previous visits.         Current Assessment & Plan            Component Ref Range & Units 5 mo ago 7 mo ago 1 yr ago   Hemoglobin A1C % 6.2 7.4 High  R, CM 6.6 R             " Relevant Medications    tirzepatide (MOUNJARO) 2.5 mg/0.5 mL PnIj    Other Relevant Orders    Hemoglobin A1C    Microalbumin/Creatinine Ratio, Urine     Other Visit Diagnoses       Screening for prostate cancer        This diagnosis does not apply to this visit, appropriate prostate cancer screening will be ordered for next visit/wellness exam.    Relevant Orders    PSA, Screening                    Follow up in about 27 weeks (around 9/23/2024) for Wellness.

## 2024-03-18 ENCOUNTER — OFFICE VISIT (OUTPATIENT)
Dept: PRIMARY CARE CLINIC | Facility: CLINIC | Age: 59
End: 2024-03-18
Payer: COMMERCIAL

## 2024-03-18 VITALS
HEIGHT: 67 IN | WEIGHT: 190.38 LBS | HEART RATE: 66 BPM | DIASTOLIC BLOOD PRESSURE: 88 MMHG | BODY MASS INDEX: 29.88 KG/M2 | SYSTOLIC BLOOD PRESSURE: 137 MMHG

## 2024-03-18 DIAGNOSIS — Z82.49 FAMILY HISTORY OF EARLY CAD: Chronic | ICD-10-CM

## 2024-03-18 DIAGNOSIS — E11.9 TYPE 2 DIABETES MELLITUS WITHOUT COMPLICATION, WITHOUT LONG-TERM CURRENT USE OF INSULIN: Chronic | ICD-10-CM

## 2024-03-18 DIAGNOSIS — J30.2 SEASONAL ALLERGIES: Chronic | ICD-10-CM

## 2024-03-18 DIAGNOSIS — E78.5 DYSLIPIDEMIA: Chronic | ICD-10-CM

## 2024-03-18 DIAGNOSIS — E66.09 CLASS 1 OBESITY DUE TO EXCESS CALORIES WITH SERIOUS COMORBIDITY AND BODY MASS INDEX (BMI) OF 30.0 TO 30.9 IN ADULT: Chronic | ICD-10-CM

## 2024-03-18 DIAGNOSIS — Z12.5 SCREENING FOR PROSTATE CANCER: ICD-10-CM

## 2024-03-18 DIAGNOSIS — I10 PRIMARY HYPERTENSION: Primary | Chronic | ICD-10-CM

## 2024-03-18 LAB
EST. AVERAGE GLUCOSE BLD GHB EST-MCNC: 145.6 MG/DL
HBA1C MFR BLD: 6.7 %

## 2024-03-18 PROCEDURE — 83036 HEMOGLOBIN GLYCOSYLATED A1C: CPT | Performed by: GENERAL PRACTICE

## 2024-03-18 PROCEDURE — 3075F SYST BP GE 130 - 139MM HG: CPT | Mod: CPTII,,, | Performed by: GENERAL PRACTICE

## 2024-03-18 PROCEDURE — 3008F BODY MASS INDEX DOCD: CPT | Mod: CPTII,,, | Performed by: GENERAL PRACTICE

## 2024-03-18 PROCEDURE — 3079F DIAST BP 80-89 MM HG: CPT | Mod: CPTII,,, | Performed by: GENERAL PRACTICE

## 2024-03-18 PROCEDURE — 36415 COLL VENOUS BLD VENIPUNCTURE: CPT | Mod: ,,, | Performed by: GENERAL PRACTICE

## 2024-03-18 PROCEDURE — 99214 OFFICE O/P EST MOD 30 MIN: CPT | Mod: ,,, | Performed by: GENERAL PRACTICE

## 2024-03-18 PROCEDURE — 4010F ACE/ARB THERAPY RXD/TAKEN: CPT | Mod: CPTII,,, | Performed by: GENERAL PRACTICE

## 2024-03-18 PROCEDURE — 1160F RVW MEDS BY RX/DR IN RCRD: CPT | Mod: CPTII,,, | Performed by: GENERAL PRACTICE

## 2024-03-18 PROCEDURE — 1159F MED LIST DOCD IN RCRD: CPT | Mod: CPTII,,, | Performed by: GENERAL PRACTICE

## 2024-03-18 PROCEDURE — 36415 COLL VENOUS BLD VENIPUNCTURE: CPT | Performed by: GENERAL PRACTICE

## 2024-03-18 RX ORDER — TIRZEPATIDE 2.5 MG/.5ML
2.5 INJECTION, SOLUTION SUBCUTANEOUS
Qty: 4 PEN | Refills: 11 | Status: SHIPPED | OUTPATIENT
Start: 2024-03-18 | End: 2024-04-09 | Stop reason: DRUGHIGH

## 2024-04-01 ENCOUNTER — TELEPHONE (OUTPATIENT)
Dept: PRIMARY CARE CLINIC | Facility: CLINIC | Age: 59
End: 2024-04-01
Payer: COMMERCIAL

## 2024-04-01 NOTE — TELEPHONE ENCOUNTER
----- Message from Luciana Bryant LPN sent at 4/1/2024  2:14 PM CDT -----  Regarding: FW: advice  He will need to go to Urgent care    ----- Message -----  From: Bouchra Maldonado  Sent: 4/1/2024   1:54 PM CDT  To: Ravin Lara Staff  Subject: advice                                           Type:  Needs Medical Advice    Who Called: pt  Symptoms (please be specific):  cough, chills, sinus, congestion, fatigue    Best Call Back Number: [phone]  Additional Information: stated that he is sick again and that he is needing to speak to the nurse about it. Please advise

## 2024-04-09 ENCOUNTER — TELEPHONE (OUTPATIENT)
Dept: PRIMARY CARE CLINIC | Facility: CLINIC | Age: 59
End: 2024-04-09
Payer: COMMERCIAL

## 2024-04-09 DIAGNOSIS — E11.9 TYPE 2 DIABETES MELLITUS WITHOUT COMPLICATION, WITHOUT LONG-TERM CURRENT USE OF INSULIN: Primary | Chronic | ICD-10-CM

## 2024-04-09 RX ORDER — TIRZEPATIDE 5 MG/.5ML
5 INJECTION, SOLUTION SUBCUTANEOUS
Qty: 4 PEN | Refills: 11 | Status: SHIPPED | OUTPATIENT
Start: 2024-04-09 | End: 2024-04-11 | Stop reason: SDUPTHER

## 2024-04-09 NOTE — TELEPHONE ENCOUNTER
PT stated his first month he was charged $25.00. Now they want to charge him $1,000. Pt was told the PA we did was approved. To;d patient we can maybe increase dosage to see if insurance will approve medication again.

## 2024-04-09 NOTE — TELEPHONE ENCOUNTER
----- Message from Ami Martinez sent at 4/9/2024 10:13 AM CDT -----  Type:  Needs Medical Advice    Who Called:  pt      Would the patient rather a call back or a response via [facility]? [place]    Best Call Back Number:[phone]      Additional Information:  pt called for return call back to discuss medication (tirzepatide (MOUNJARO) 2.5 mg/0.5 mL PnIj 4 Pen), please advise, thanks

## 2024-04-10 ENCOUNTER — TELEPHONE (OUTPATIENT)
Dept: PRIMARY CARE CLINIC | Facility: CLINIC | Age: 59
End: 2024-04-10
Payer: COMMERCIAL

## 2024-04-10 NOTE — TELEPHONE ENCOUNTER
----- Message from Luciana Bryant LPN sent at 4/10/2024  3:16 PM CDT -----    ----- Message -----  From: Ami Martinez  Sent: 4/10/2024   3:02 PM CDT  To: Ravin Lara Staff    Type:  Needs Medical Advice    Who Called:  pt    Would the patient rather a call back or a response via [facility]?      Best Call Back Number:  [phone]    Additional Information:  pt called and says Rx came back at $250, please advise, thanks

## 2024-04-11 DIAGNOSIS — E11.9 TYPE 2 DIABETES MELLITUS WITHOUT COMPLICATION, WITHOUT LONG-TERM CURRENT USE OF INSULIN: Chronic | ICD-10-CM

## 2024-04-11 RX ORDER — TIRZEPATIDE 5 MG/.5ML
5 INJECTION, SOLUTION SUBCUTANEOUS
Qty: 4 PEN | Refills: 11 | Status: SHIPPED | OUTPATIENT
Start: 2024-04-11 | End: 2024-04-25 | Stop reason: SDUPTHER

## 2024-04-25 DIAGNOSIS — E11.9 TYPE 2 DIABETES MELLITUS WITHOUT COMPLICATION, WITHOUT LONG-TERM CURRENT USE OF INSULIN: Chronic | ICD-10-CM

## 2024-04-25 RX ORDER — TIRZEPATIDE 5 MG/.5ML
5 INJECTION, SOLUTION SUBCUTANEOUS
Qty: 4 PEN | Refills: 11 | Status: SHIPPED | OUTPATIENT
Start: 2024-04-25 | End: 2025-04-25

## 2024-04-29 ENCOUNTER — TELEPHONE (OUTPATIENT)
Dept: PRIMARY CARE CLINIC | Facility: CLINIC | Age: 59
End: 2024-04-29
Payer: COMMERCIAL

## 2024-04-29 DIAGNOSIS — E11.9 TYPE 2 DIABETES MELLITUS WITHOUT COMPLICATION, WITHOUT LONG-TERM CURRENT USE OF INSULIN: Primary | ICD-10-CM

## 2024-04-29 NOTE — TELEPHONE ENCOUNTER
----- Message from Bouchra Maldonado sent at 4/29/2024  8:30 AM CDT -----  Regarding: advice  Type:  Needs Medical Advice    Who Called: pt    Best Call Back Number: [phone]  Additional Information: stated that tirzepatide (MOUNJARO) 5 mg/0.5 mL PnIj  Is on back order and wanted to know if he can have the script changed to the 2mg. Please advise

## 2024-05-03 RX ORDER — AZELASTINE HYDROCHLORIDE, FLUTICASONE PROPIONATE 137; 50 UG/1; UG/1
1 SPRAY, METERED NASAL 2 TIMES DAILY
COMMUNITY
Start: 2023-12-07 | End: 2024-05-30 | Stop reason: SDUPTHER

## 2024-05-13 ENCOUNTER — OFFICE VISIT (OUTPATIENT)
Dept: PRIMARY CARE CLINIC | Facility: CLINIC | Age: 59
End: 2024-05-13
Payer: COMMERCIAL

## 2024-05-13 VITALS
BODY MASS INDEX: 29.82 KG/M2 | OXYGEN SATURATION: 99 % | RESPIRATION RATE: 18 BRPM | SYSTOLIC BLOOD PRESSURE: 136 MMHG | HEIGHT: 67 IN | HEART RATE: 88 BPM | DIASTOLIC BLOOD PRESSURE: 88 MMHG

## 2024-05-13 DIAGNOSIS — F32.A DEPRESSION, UNSPECIFIED DEPRESSION TYPE: Chronic | ICD-10-CM

## 2024-05-13 DIAGNOSIS — F41.1 GAD (GENERALIZED ANXIETY DISORDER): Primary | Chronic | ICD-10-CM

## 2024-05-13 PROCEDURE — 3044F HG A1C LEVEL LT 7.0%: CPT | Mod: CPTII,,, | Performed by: GENERAL PRACTICE

## 2024-05-13 PROCEDURE — 99214 OFFICE O/P EST MOD 30 MIN: CPT | Mod: ,,, | Performed by: GENERAL PRACTICE

## 2024-05-13 PROCEDURE — 1159F MED LIST DOCD IN RCRD: CPT | Mod: CPTII,,, | Performed by: GENERAL PRACTICE

## 2024-05-13 PROCEDURE — 1160F RVW MEDS BY RX/DR IN RCRD: CPT | Mod: CPTII,,, | Performed by: GENERAL PRACTICE

## 2024-05-13 PROCEDURE — 3079F DIAST BP 80-89 MM HG: CPT | Mod: CPTII,,, | Performed by: GENERAL PRACTICE

## 2024-05-13 PROCEDURE — 3075F SYST BP GE 130 - 139MM HG: CPT | Mod: CPTII,,, | Performed by: GENERAL PRACTICE

## 2024-05-13 PROCEDURE — 3008F BODY MASS INDEX DOCD: CPT | Mod: CPTII,,, | Performed by: GENERAL PRACTICE

## 2024-05-13 PROCEDURE — 4010F ACE/ARB THERAPY RXD/TAKEN: CPT | Mod: CPTII,,, | Performed by: GENERAL PRACTICE

## 2024-05-13 RX ORDER — SERTRALINE HYDROCHLORIDE 50 MG/1
50 TABLET, FILM COATED ORAL DAILY
Qty: 30 TABLET | Refills: 11 | Status: SHIPPED | OUTPATIENT
Start: 2024-05-13 | End: 2025-05-13

## 2024-05-13 NOTE — PROGRESS NOTES
"Subjective:       Patient ID: Damian Ta is a 58 y.o. male.    Chief Complaint: Anxiety    Established patient, presents to the clinic for a discussion.  He has been in a relationship for several years.  He has had some issues with anger, anxiety, some of it associated with consuming alcohol.  He is quite upset, saddened by the potential loss of this relationship.  After discussing this with friends and coworkers, there was some suggestions that he possibly consider getting on some type of medication, Zoloft was a recommendation.  No suicidal ideations, no other significant issues or problems today.      Review of Systems   Constitutional:  Negative for activity change, appetite change and fatigue.   Respiratory:  Negative for shortness of breath.    Cardiovascular:  Negative for chest pain.   Neurological:  Negative for memory loss.   Psychiatric/Behavioral:  Positive for dysphoric mood (Some situational sadness). Negative for agitation, confusion, decreased concentration and sleep disturbance. The patient is nervous/anxious.            Patient Care Team:  Marco Alicia MD as PCP - General (Family Medicine)  Alicia Perera LPN as Care Coordinator  Duane Dash MD as Consulting Physician (Gastroenterology)  Garett Aviles MD as Consulting Physician (Urology)  Objective:     Vitals:    05/13/24 1317   BP: 136/88   Pulse: 88   Resp: 18   SpO2: 99%   Height: 5' 7" (1.702 m)   Body mass index is 29.82 kg/m².     Physical Exam  Constitutional:       Appearance: Normal appearance.   Cardiovascular:      Rate and Rhythm: Normal rate.   Pulmonary:      Effort: Pulmonary effort is normal.   Psychiatric:         Behavior: Behavior normal.         Thought Content: Thought content normal.         Judgment: Judgment normal.      Comments: Depressed mood           Assessment:       ICD-10-CM ICD-9-CM   1. ESTRELLITA (generalized anxiety disorder)  F41.1 300.02   2. Depression, unspecified depression type  F32.A 311 "       Current Outpatient Medications   Medication Instructions    azelastine (ASTELIN) 137 mcg, Nasal, 2 times daily    azelastine-fluticasone (DYMISTA) 137-50 mcg/spray Spry nassal spray 1 spray, Each Nostril, 2 times daily    losartan (COZAAR) 50 mg, Oral, Daily    metFORMIN (GLUCOPHAGE-XR) 500 mg, Oral, With breakfast    MOUNJARO 5 mg, Subcutaneous, Every 7 days    rosuvastatin (CRESTOR) 10 mg, Oral, Daily    sertraline (ZOLOFT) 50 mg, Oral, Daily    sodium,potassium,mag sulfates (SUPREP BOWEL PREP KIT) 17.5-3.13-1.6 gram SolR 1 kit, Oral    tadalafiL (CIALIS) 5 mg, Oral, Daily PRN    testosterone cypionate (DEPOTESTOTERONE CYPIONATE) 200 mg/mL injection Intramuscular, Every 28 days    tirzepatide 2.5 mg, Subcutaneous, Every 7 days     Plan:     Problem List Items Addressed This Visit          Psychiatric    Depression (Chronic)    Overview     Prescribed sertraline 50 mg daily.    Recheck in six weeks.         ESTRELLITA (generalized anxiety disorder) - Primary (Chronic)    Overview     Prescribed sertraline 50 mg daily.    Recheck in six weeks.         Relevant Medications    sertraline (ZOLOFT) 50 MG tablet               Follow up in about 6 weeks (around 6/24/2024) for Mood D/O F/up.

## 2024-05-19 ENCOUNTER — TELEPHONE (OUTPATIENT)
Dept: PHARMACY | Facility: CLINIC | Age: 59
End: 2024-05-19
Payer: COMMERCIAL

## 2024-05-20 DIAGNOSIS — J30.2 SEASONAL ALLERGIES: Chronic | ICD-10-CM

## 2024-05-20 RX ORDER — AZELASTINE 1 MG/ML
1 SPRAY, METERED NASAL 2 TIMES DAILY
Qty: 30 ML | Refills: 11 | Status: SHIPPED | OUTPATIENT
Start: 2024-05-20

## 2024-05-30 DIAGNOSIS — J30.2 SEASONAL ALLERGIES: Primary | ICD-10-CM

## 2024-05-30 DIAGNOSIS — F41.1 GAD (GENERALIZED ANXIETY DISORDER): Primary | Chronic | ICD-10-CM

## 2024-05-30 RX ORDER — AZELASTINE HYDROCHLORIDE, FLUTICASONE PROPIONATE 137; 50 UG/1; UG/1
1 SPRAY, METERED NASAL 2 TIMES DAILY
Qty: 23 G | Refills: 5 | Status: SHIPPED | OUTPATIENT
Start: 2024-05-30

## 2024-06-03 ENCOUNTER — PATIENT OUTREACH (OUTPATIENT)
Facility: CLINIC | Age: 59
End: 2024-06-03
Payer: COMMERCIAL

## 2024-06-03 DIAGNOSIS — E11.9 TYPE 2 DIABETES MELLITUS WITHOUT COMPLICATION, WITHOUT LONG-TERM CURRENT USE OF INSULIN: Primary | Chronic | ICD-10-CM

## 2024-06-03 NOTE — PROGRESS NOTES
Health Maintenance Topic(s) Outreach Outcomes & Actions Taken:    Eye Exam - Outreach Outcomes & Actions Taken  : Eye Exam Screening Order Placed and Will do in PCP office, does not wear glasses.           Additional Notes:           Care Management, Digital Medicine, and/or Education Referrals      Eligible for Diabetic Education., A1c WNL

## 2024-06-10 LAB — CRC RECOMMENDATION EXT: NORMAL

## 2024-06-11 ENCOUNTER — DOCUMENTATION ONLY (OUTPATIENT)
Dept: PRIMARY CARE CLINIC | Facility: CLINIC | Age: 59
End: 2024-06-11
Payer: COMMERCIAL

## 2024-06-21 ENCOUNTER — TELEPHONE (OUTPATIENT)
Dept: PRIMARY CARE CLINIC | Facility: CLINIC | Age: 59
End: 2024-06-21
Payer: COMMERCIAL

## 2024-06-21 NOTE — TELEPHONE ENCOUNTER
----- Message from Guevara Vang sent at 6/21/2024 10:41 AM CDT -----  Regarding: Question  Type:  Needs Medical Advice    Who Called: Damian  Symptoms (please be specific):    How long has patient had these symptoms:    Pharmacy name and phone #:    Would the patient rather a call back or a response via [facility]?   Best Call Back Number: [phone]  Additional Information: Pt. Is requesting a call back from [facility] regarding updates.

## 2024-06-24 PROBLEM — K63.5 POLYP OF COLON: Status: ACTIVE | Noted: 2024-06-10

## 2024-07-06 PROBLEM — F32.1 CURRENT MODERATE EPISODE OF MAJOR DEPRESSIVE DISORDER WITHOUT PRIOR EPISODE: Chronic | Status: ACTIVE | Noted: 2022-06-15

## 2024-07-06 NOTE — PROGRESS NOTES
"Subjective:       Patient ID: Damian Ta is a 58 y.o. male.    Chief Complaint: Mood Disorder and Follow-up    Established patient, presents for F/up for depression, started on sertraline at his last visit about a month ago.      Review of Systems   Constitutional: Negative.  Negative for fatigue and fever.   HENT: Negative.  Negative for sore throat and trouble swallowing.    Eyes: Negative.  Negative for redness and visual disturbance.   Respiratory: Negative.  Negative for chest tightness and shortness of breath.    Cardiovascular: Negative.  Negative for chest pain.   Gastrointestinal: Negative.  Negative for abdominal pain, blood in stool and nausea.   Endocrine: Negative.  Negative for cold intolerance, heat intolerance and polyuria.   Genitourinary: Negative.    Musculoskeletal: Negative.  Negative for arthralgias, gait problem and joint swelling.   Integumentary:  Negative for rash. Negative.   Neurological: Negative.  Negative for dizziness, weakness and headaches.   Psychiatric/Behavioral: Negative.  Negative for agitation and dysphoric mood.            Patient Care Team:  Marco Alicia MD as PCP - General (Family Medicine)  Alicia Perera LPN as Care Coordinator  Duane Dash MD as Consulting Physician (Gastroenterology)  Garett Aviles MD as Consulting Physician (Urology)  Objective:     Vitals:    07/08/24 1501   BP: (!) 159/93   Pulse: 76   Resp: 18   SpO2: 99%   Weight: 77.1 kg (170 lb)   Height: 5' 7" (1.702 m)   Body mass index is 26.63 kg/m².     Physical Exam  Constitutional:       Appearance: Normal appearance.   Eyes:      Conjunctiva/sclera: Conjunctivae normal.   Cardiovascular:      Rate and Rhythm: Normal rate and regular rhythm.   Pulmonary:      Effort: Pulmonary effort is normal.      Breath sounds: Normal breath sounds.   Skin:     General: Skin is warm and dry.   Neurological:      Mental Status: He is alert.   Psychiatric:         Mood and Affect: Mood normal.         " Behavior: Behavior normal.           Assessment:       ICD-10-CM ICD-9-CM   1. Current moderate episode of major depressive disorder without prior episode  F32.1 296.22   2. Type 2 diabetes mellitus without complication, without long-term current use of insulin  E11.9 250.00       Current Outpatient Medications   Medication Instructions    azelastine (ASTELIN) 137 mcg, Nasal, 2 times daily    azelastine-fluticasone (DYMISTA) 137-50 mcg/spray Spry nassal spray 1 spray, Each Nostril, 2 times daily    losartan (COZAAR) 50 mg, Oral, Daily    metFORMIN (GLUCOPHAGE-XR) 500 mg, Oral, With breakfast    MOUNJARO 5 mg, Subcutaneous, Every 7 days    rosuvastatin (CRESTOR) 10 mg, Oral, Daily    tadalafiL (CIALIS) 5 mg, Oral, Daily PRN    TRINTELLIX 10 mg Tab 1 tablet, Oral, Every morning     Plan:     Problem List Items Addressed This Visit          Psychiatric    Current moderate episode of major depressive disorder without prior episode - Primary (Chronic)    Overview     Prescribed Trintellix 10 mg daily.  Sees Louann Abreu.    Patient will continue care with a specialist, seems to be fairly stable.            Endocrine    Type 2 diabetes mellitus without complication, without long-term current use of insulin (Chronic)    Overview     Prescribed metformin  mg daily, Mounjaro 2.5 mg weekly.    Mounjaro started on 03/18/2024.  Current A1c pending.    Most recent hemoglobin A1c shows good control on diabetic treatment plan, including diabetic prescription medication.  Continue current treatment plan, medical regimen, and lifestyle modification as discussed at this visit and during previous visits.                    Follow up for next appointment as scheduled or as needed.

## 2024-07-08 ENCOUNTER — OFFICE VISIT (OUTPATIENT)
Dept: PRIMARY CARE CLINIC | Facility: CLINIC | Age: 59
End: 2024-07-08
Payer: COMMERCIAL

## 2024-07-08 ENCOUNTER — CLINICAL SUPPORT (OUTPATIENT)
Dept: PRIMARY CARE CLINIC | Facility: CLINIC | Age: 59
End: 2024-07-08
Attending: GENERAL PRACTICE
Payer: COMMERCIAL

## 2024-07-08 VITALS
RESPIRATION RATE: 18 BRPM | OXYGEN SATURATION: 99 % | SYSTOLIC BLOOD PRESSURE: 159 MMHG | HEART RATE: 76 BPM | DIASTOLIC BLOOD PRESSURE: 93 MMHG | BODY MASS INDEX: 26.68 KG/M2 | HEIGHT: 67 IN | WEIGHT: 170 LBS

## 2024-07-08 DIAGNOSIS — F32.1 CURRENT MODERATE EPISODE OF MAJOR DEPRESSIVE DISORDER WITHOUT PRIOR EPISODE: Primary | Chronic | ICD-10-CM

## 2024-07-08 DIAGNOSIS — E11.9 TYPE 2 DIABETES MELLITUS WITHOUT COMPLICATION, WITHOUT LONG-TERM CURRENT USE OF INSULIN: Chronic | ICD-10-CM

## 2024-07-08 LAB
CREAT UR-MCNC: 122.1 MG/DL (ref 63–166)
MICROALBUMIN UR-MCNC: 54.1 UG/ML
MICROALBUMIN/CREAT RATIO PNL UR: 44.3 MG/GM CR (ref 0–30)

## 2024-07-08 PROCEDURE — 92228 IMG RTA DETC/MNTR DS PHY/QHP: CPT | Mod: 26,,, | Performed by: OPHTHALMOLOGY

## 2024-07-08 PROCEDURE — 99213 OFFICE O/P EST LOW 20 MIN: CPT | Mod: ,,, | Performed by: GENERAL PRACTICE

## 2024-07-08 PROCEDURE — 3080F DIAST BP >= 90 MM HG: CPT | Mod: CPTII,,, | Performed by: GENERAL PRACTICE

## 2024-07-08 PROCEDURE — 3044F HG A1C LEVEL LT 7.0%: CPT | Mod: CPTII,,, | Performed by: GENERAL PRACTICE

## 2024-07-08 PROCEDURE — 92228 IMG RTA DETC/MNTR DS PHY/QHP: CPT | Mod: TC,,, | Performed by: GENERAL PRACTICE

## 2024-07-08 PROCEDURE — 1160F RVW MEDS BY RX/DR IN RCRD: CPT | Mod: CPTII,,, | Performed by: GENERAL PRACTICE

## 2024-07-08 PROCEDURE — 2025F 7 FLD RTA PHOTO W/O RTNOPTHY: CPT | Mod: CPTII,,, | Performed by: OPHTHALMOLOGY

## 2024-07-08 PROCEDURE — 4010F ACE/ARB THERAPY RXD/TAKEN: CPT | Mod: CPTII,,, | Performed by: GENERAL PRACTICE

## 2024-07-08 PROCEDURE — 3077F SYST BP >= 140 MM HG: CPT | Mod: CPTII,,, | Performed by: GENERAL PRACTICE

## 2024-07-08 PROCEDURE — 3008F BODY MASS INDEX DOCD: CPT | Mod: CPTII,,, | Performed by: GENERAL PRACTICE

## 2024-07-08 PROCEDURE — 82570 ASSAY OF URINE CREATININE: CPT | Performed by: GENERAL PRACTICE

## 2024-07-08 PROCEDURE — 1159F MED LIST DOCD IN RCRD: CPT | Mod: CPTII,,, | Performed by: GENERAL PRACTICE

## 2024-07-08 RX ORDER — VORTIOXETINE 10 MG/1
1 TABLET, FILM COATED ORAL EVERY MORNING
COMMUNITY
Start: 2024-07-03

## 2024-07-08 NOTE — PROGRESS NOTES
Damian Ta is a 58 y.o. male here for a diabetic eye screening with non-dilated fundus photos per .    Patient cooperative?: Yes  Small pupils?: No  Last eye exam: Unknown    For exam results, see Encounter Report.

## 2024-08-19 ENCOUNTER — TELEPHONE (OUTPATIENT)
Dept: PRIMARY CARE CLINIC | Facility: CLINIC | Age: 59
End: 2024-08-19
Payer: COMMERCIAL

## 2024-08-19 NOTE — TELEPHONE ENCOUNTER
----- Message from Clark Ventura sent at 8/19/2024  3:45 PM CDT -----  .Who Called: Damian Ta    Patient is returning phone call    Who Left Message for Patient:unknown  Does the patient know what this is regarding?:unknown      Preferred Method of Contact: Phone Call  Patient's Preferred Phone Number on File: [phone]   Best Call Back Number, if different:  Additional Information: pt states returning call no encounter in chart

## 2024-09-24 ENCOUNTER — CLINICAL SUPPORT (OUTPATIENT)
Dept: PRIMARY CARE CLINIC | Facility: CLINIC | Age: 59
End: 2024-09-24
Payer: COMMERCIAL

## 2024-09-24 DIAGNOSIS — I10 PRIMARY HYPERTENSION: ICD-10-CM

## 2024-09-24 DIAGNOSIS — E78.5 DYSLIPIDEMIA: Chronic | ICD-10-CM

## 2024-09-24 DIAGNOSIS — E11.9 TYPE 2 DIABETES MELLITUS WITHOUT COMPLICATION, WITHOUT LONG-TERM CURRENT USE OF INSULIN: Chronic | ICD-10-CM

## 2024-09-24 DIAGNOSIS — Z12.5 SCREENING FOR PROSTATE CANCER: ICD-10-CM

## 2024-09-24 LAB
ALBUMIN SERPL-MCNC: 4.1 G/DL (ref 3.5–5)
ALBUMIN/GLOB SERPL: 1.4 RATIO (ref 1.1–2)
ALP SERPL-CCNC: 70 UNIT/L (ref 40–150)
ALT SERPL-CCNC: 48 UNIT/L (ref 0–55)
ANION GAP SERPL CALC-SCNC: 8 MEQ/L
AST SERPL-CCNC: 42 UNIT/L (ref 5–34)
BASOPHILS # BLD AUTO: 0.06 X10(3)/MCL
BASOPHILS NFR BLD AUTO: 0.9 %
BILIRUB SERPL-MCNC: 0.4 MG/DL
BUN SERPL-MCNC: 9.3 MG/DL (ref 8.4–25.7)
CALCIUM SERPL-MCNC: 9.6 MG/DL (ref 8.4–10.2)
CHLORIDE SERPL-SCNC: 107 MMOL/L (ref 98–107)
CHOLEST SERPL-MCNC: 151 MG/DL
CHOLEST/HDLC SERPL: 3 {RATIO} (ref 0–5)
CO2 SERPL-SCNC: 26 MMOL/L (ref 22–29)
CREAT SERPL-MCNC: 0.91 MG/DL (ref 0.73–1.18)
CREAT/UREA NIT SERPL: 10
EOSINOPHIL # BLD AUTO: 0.19 X10(3)/MCL (ref 0–0.9)
EOSINOPHIL NFR BLD AUTO: 3 %
ERYTHROCYTE [DISTWIDTH] IN BLOOD BY AUTOMATED COUNT: 12.3 % (ref 11.5–17)
EST. AVERAGE GLUCOSE BLD GHB EST-MCNC: 139.9 MG/DL
GFR SERPLBLD CREATININE-BSD FMLA CKD-EPI: >60 ML/MIN/1.73/M2
GLOBULIN SER-MCNC: 2.9 GM/DL (ref 2.4–3.5)
GLUCOSE SERPL-MCNC: 136 MG/DL (ref 74–100)
HBA1C MFR BLD: 6.5 %
HCT VFR BLD AUTO: 42.6 % (ref 42–52)
HDLC SERPL-MCNC: 53 MG/DL (ref 35–60)
HGB BLD-MCNC: 15.1 G/DL (ref 14–18)
IMM GRANULOCYTES # BLD AUTO: 0.02 X10(3)/MCL (ref 0–0.04)
IMM GRANULOCYTES NFR BLD AUTO: 0.3 %
LDLC SERPL CALC-MCNC: 75 MG/DL (ref 50–140)
LYMPHOCYTES # BLD AUTO: 2.19 X10(3)/MCL (ref 0.6–4.6)
LYMPHOCYTES NFR BLD AUTO: 34 %
MCH RBC QN AUTO: 32.1 PG (ref 27–31)
MCHC RBC AUTO-ENTMCNC: 35.4 G/DL (ref 33–36)
MCV RBC AUTO: 90.6 FL (ref 80–94)
MONOCYTES # BLD AUTO: 0.62 X10(3)/MCL (ref 0.1–1.3)
MONOCYTES NFR BLD AUTO: 9.6 %
NEUTROPHILS # BLD AUTO: 3.36 X10(3)/MCL (ref 2.1–9.2)
NEUTROPHILS NFR BLD AUTO: 52.2 %
NRBC BLD AUTO-RTO: 0 %
PLATELET # BLD AUTO: 208 X10(3)/MCL (ref 130–400)
PMV BLD AUTO: 11.8 FL (ref 7.4–10.4)
POTASSIUM SERPL-SCNC: 3.8 MMOL/L (ref 3.5–5.1)
PROT SERPL-MCNC: 7 GM/DL (ref 6.4–8.3)
PSA SERPL-MCNC: 3.57 NG/ML
RBC # BLD AUTO: 4.7 X10(6)/MCL (ref 4.7–6.1)
SODIUM SERPL-SCNC: 141 MMOL/L (ref 136–145)
TRIGL SERPL-MCNC: 113 MG/DL (ref 34–140)
TSH SERPL-ACNC: 1.14 UIU/ML (ref 0.35–4.94)
VLDLC SERPL CALC-MCNC: 23 MG/DL
WBC # BLD AUTO: 6.44 X10(3)/MCL (ref 4.5–11.5)

## 2024-09-24 PROCEDURE — 84443 ASSAY THYROID STIM HORMONE: CPT | Performed by: GENERAL PRACTICE

## 2024-09-24 PROCEDURE — 85025 COMPLETE CBC W/AUTO DIFF WBC: CPT | Performed by: GENERAL PRACTICE

## 2024-09-24 PROCEDURE — 36415 COLL VENOUS BLD VENIPUNCTURE: CPT

## 2024-09-24 PROCEDURE — 36415 COLL VENOUS BLD VENIPUNCTURE: CPT | Mod: ,,, | Performed by: GENERAL PRACTICE

## 2024-09-24 PROCEDURE — 84153 ASSAY OF PSA TOTAL: CPT | Performed by: GENERAL PRACTICE

## 2024-09-24 PROCEDURE — 83036 HEMOGLOBIN GLYCOSYLATED A1C: CPT | Performed by: GENERAL PRACTICE

## 2024-09-24 PROCEDURE — 80053 COMPREHEN METABOLIC PANEL: CPT | Performed by: GENERAL PRACTICE

## 2024-09-24 PROCEDURE — 80061 LIPID PANEL: CPT | Performed by: GENERAL PRACTICE

## 2024-09-29 PROBLEM — R53.83 FATIGUE: Chronic | Status: RESOLVED | Noted: 2021-08-10 | Resolved: 2024-09-29

## 2024-09-29 PROBLEM — K63.5 POLYP OF COLON: Status: RESOLVED | Noted: 2024-06-10 | Resolved: 2024-09-29

## 2024-09-29 PROBLEM — F52.0 DECREASED SEXUAL DESIRE: Chronic | Status: RESOLVED | Noted: 2021-08-10 | Resolved: 2024-09-29

## 2024-09-30 ENCOUNTER — DOCUMENTATION ONLY (OUTPATIENT)
Dept: PRIMARY CARE CLINIC | Facility: CLINIC | Age: 59
End: 2024-09-30

## 2024-10-07 ENCOUNTER — TELEPHONE (OUTPATIENT)
Dept: PRIMARY CARE CLINIC | Facility: CLINIC | Age: 59
End: 2024-10-07

## 2024-10-07 NOTE — TELEPHONE ENCOUNTER
----- Message from Tuxebo sent at 10/7/2024 11:36 AM CDT -----  Regarding: sooner appt  Who Called: Damian Ta    Caller is requesting a sooner appointment. Caller declined first available appointment listed below. Caller will not accept being placed on the waitlist and is requesting a message be sent to doctor.    When is the first available appointment? January   Options offered (Virtual Visit, Urgent Care):   Symptoms:wellness      Preferred Method of Contact: Phone Call  Patient's Preferred Phone Number on File: [phone]   Best Call Back Number, if different:  Additional Information: stated that he had to go out of town for an emergency and is needing to have wellness jonah to something sooner than January. Please advise

## 2024-11-05 ENCOUNTER — PATIENT OUTREACH (OUTPATIENT)
Facility: CLINIC | Age: 59
End: 2024-11-05
Payer: COMMERCIAL

## 2024-11-05 ENCOUNTER — PATIENT MESSAGE (OUTPATIENT)
Facility: CLINIC | Age: 59
End: 2024-11-05
Payer: COMMERCIAL

## 2024-11-05 NOTE — LETTER
Dear Gary [facility] is committed to your overall health. Periodically we review the health information in your chart to make sure you are up to date on all of your recommended tests and/or procedures.       Our review of your chart shows that you may be due for        Uncontrolled BP             If you have had any of the above done at another facility, please let us know and we will request a copy of the report to update your [facility] record.       At your convenience I would like to speak to you to help get these items scheduled (if needed) and also see if there is anything else we can do to help you.     Please send me a message via your patient portal or give me a call at [phone].  I am looking forward to speaking with you soon.     Sincerely,NAN Stover Care Coordinator  Marco Alicia MD and your [facility] Team

## 2024-11-07 ENCOUNTER — TELEPHONE (OUTPATIENT)
Facility: CLINIC | Age: 59
End: 2024-11-07
Payer: COMMERCIAL

## 2024-11-07 VITALS — SYSTOLIC BLOOD PRESSURE: 128 MMHG | DIASTOLIC BLOOD PRESSURE: 78 MMHG

## 2024-11-07 NOTE — TELEPHONE ENCOUNTER
Pt given verbal order from  to hold off to BP med. Pt stated when he bends down he gets dizzy. Pt stated his BP was around 124/86. Pt told once again to hold his bp meds per  and to keep his appt on 11/11. Pt vocalized understanding and had no questions or concerns.

## 2024-11-07 NOTE — PROGRESS NOTES
Patient called back with BP reading, states BP today is 128/78 He continues to take Cozaar 50 mg nightly  States he has been getting dizzy when he puts his head down, he is able to continue daily activities, but he paying more attention and concerned, since his brother just suffered a heart attack.  Further assessment revealed a 30 lb. weight loss, since Mounjaro injections. He is also beginning to exercise, run again.  Recommended patient to hold off on BP medication until he gets a call from PCP, with further instruction.   I will notify PC office and and continue to follow case until resolved..  Encouraged patient to call me if needed., I will forward this message to the PCP

## 2024-11-07 NOTE — PROGRESS NOTES
"  Informed patient Doctor's office will reach out to him with further instructions, I also recommended if dizziness worsens please go to the nearest ER, patient verbalized understanding, states " I've been dizzy for a long time". Message to PCP requesting they call the patient.  "

## 2024-11-10 NOTE — PROGRESS NOTES
"Subjective:       Patient ID: Damian Ta is a 59 y.o. male.    Chief Complaint: Annual Exam    Patient presents to the clinic today for wellness examination.  Current and past medical history reviewed  Pertinent family and social history reviewed    Colorectal cancer screening:  CRC screening reviewed  Prostate CA screening:  Prostate CA screening reviewed  Vaccinations due:  All vaccinations due and/or desired have been addressed and given.    No complaints today.        Review of Systems   Constitutional: Negative.  Negative for fatigue and fever.   HENT: Negative.  Negative for sore throat and trouble swallowing.    Eyes: Negative.  Negative for redness and visual disturbance.   Respiratory: Negative.  Negative for chest tightness and shortness of breath.    Cardiovascular: Negative.  Negative for chest pain.   Gastrointestinal: Negative.  Negative for abdominal pain, blood in stool and nausea.   Endocrine: Negative.  Negative for cold intolerance, heat intolerance and polyuria.   Genitourinary: Negative.    Musculoskeletal: Negative.  Negative for arthralgias, gait problem and joint swelling.   Integumentary:  Negative for rash. Negative.   Neurological: Negative.  Negative for dizziness, weakness and headaches.   Psychiatric/Behavioral: Negative.  Negative for agitation and dysphoric mood.            Patient Care Team:  Marco Alicia MD as PCP - General (Family Medicine)  Alicia Perera LPN as Care Coordinator  Duane Dash MD as Consulting Physician (Gastroenterology)  Garett Aviles MD as Consulting Physician (Urology)  Objective:   Visit Vitals  /88   Pulse 88   Resp 18   Ht 5' 7" (1.702 m)   Wt 79.4 kg (175 lb)   SpO2 99%   BMI 27.41 kg/m²        Physical Exam  Constitutional:       Appearance: Normal appearance.   HENT:      Head: Normocephalic.      Mouth/Throat:      Mouth: Mucous membranes are moist.      Pharynx: Oropharynx is clear.   Eyes:      Conjunctiva/sclera: Conjunctivae " normal.      Pupils: Pupils are equal, round, and reactive to light.   Cardiovascular:      Rate and Rhythm: Normal rate and regular rhythm.      Heart sounds: Normal heart sounds.   Pulmonary:      Effort: Pulmonary effort is normal.      Breath sounds: Normal breath sounds.   Abdominal:      General: Abdomen is flat.      Palpations: Abdomen is soft.   Musculoskeletal:         General: Normal range of motion.      Cervical back: Neck supple.   Skin:     General: Skin is warm and dry.   Neurological:      General: No focal deficit present.      Mental Status: He is alert and oriented to person, place, and time.   Psychiatric:         Mood and Affect: Mood normal.         Behavior: Behavior normal.         Thought Content: Thought content normal.         Judgment: Judgment normal.           Lab Results   Component Value Date     (H) 08/07/2023     09/24/2024    K 3.8 09/24/2024     09/24/2024    CO2 26 09/24/2024    BUN 9.3 09/24/2024    CREATININE 0.91 09/24/2024    CALCIUM 9.6 09/24/2024    ALBUMIN 4.1 09/24/2024    BILITOT 0.4 09/24/2024    ALKPHOS 70 09/24/2024    AST 42 (H) 09/24/2024    ALT 48 09/24/2024    EGFRNORACEVR >60 09/24/2024     Lab Results   Component Value Date    WBC 6.44 09/24/2024    RBC 4.70 09/24/2024    HGB 15.1 09/24/2024    HCT 42.6 09/24/2024    MCV 90.6 09/24/2024    RDW 12.3 09/24/2024     09/24/2024      Lab Results   Component Value Date    CHOL 151 09/24/2024    TRIG 113 09/24/2024    HDL 53 09/24/2024    LDL 75.00 09/24/2024    TOTALCHOLEST 3 09/24/2024     Lab Results   Component Value Date    TSH 1.138 09/24/2024     Lab Results   Component Value Date    HGBA1C 6.5 09/24/2024        Lab Results   Component Value Date    PSA 3.57 09/24/2024         Assessment:       ICD-10-CM ICD-9-CM   1. Wellness examination  Z00.00 V70.0   2. Screening for prostate cancer  Z12.5 V76.44   3. Primary hypertension  I10 401.9   4. Type 2 diabetes mellitus without  complication, without long-term current use of insulin  E11.9 250.00   5. Dyslipidemia  E78.5 272.4   6. Family history of early CAD  Z82.49 V17.3   7. Low testosterone  R79.89 790.99       Current Outpatient Medications   Medication Instructions    azelastine (ASTELIN) 137 mcg, Nasal, 2 times daily    MOUNJARO 5 mg, Subcutaneous, Every 7 days    rosuvastatin (CRESTOR) 10 mg, Oral, Daily    tadalafiL (CIALIS) 5 mg, Oral, Daily PRN    tamsulosin (FLOMAX) 0.4 mg Cap 1 capsule, 2 times daily     Plan:     Problem List Items Addressed This Visit          Cardiac/Vascular    Dyslipidemia (Chronic)    Overview     Prescribed Crestor 10 mg daily.    Most recent cholesterol/lipid blood testing shows adequate control, continue current treatment plan, including prescription medications if prescribed, and/or diet high in fiber, low in saturated fats as discussed during clinic visit.    Medication will be continued at current dosage.         Primary hypertension (Chronic)    Overview     Prescribe losartan 50 mg daily.    Blood pressures appear to be adequately controlled with current treatment plan, including prescription blood pressure medication.  Medication(s) appear to be effective at current dosages, and are not causing any side effects or problems.  Continue medical management and continue home blood pressure checks.  Average blood pressures at home should be no greater than 130/80.  Patient instructed to contact the clinic if blood pressures become elevated at any point prior to next clinic visit.    Medication will be continued at the current dosage.         Family history of early CAD (Chronic)    Overview     Family members with known history of coronary artery disease:    Cardiovascular risk factors that have been addressed and are being actively monitor and/or treated are as follows:    Hypertension, dyslipidemia, diabetes mellitus.            Endocrine    Type 2 diabetes mellitus without complication, without  long-term current use of insulin (Chronic)    Overview     Prescribed metformin  mg daily, Mounjaro 5 mg weekly.    Most recent hemoglobin A1c shows good control on diabetic treatment plan, including diabetic prescription medication.  Continue current treatment plan, medical regimen, and lifestyle modification as discussed at this visit and during previous visits.    Medication will be continued at current dosage.         Current Assessment & Plan              Component Ref Range & Units 1 mo ago  (9/24/24) 7 mo ago  (3/18/24) 1 yr ago  (9/21/23) 1 yr ago  (8/7/23) 1 yr ago  (12/13/22)   Hemoglobin A1c <=7.0 % 6.5 6.7 6.2 R 7.4 High  R, CM 6.6   Estimated Average Glucose mg/dL 139.9 145.6   142.7               Relevant Orders    Hemoglobin A1C    Microalbumin/Creatinine Ratio, Urine    Low testosterone (Chronic)    Overview     Patient is seeing Dr. Rush Aviles for testosterone replacement therapy.             Other Visit Diagnoses       Wellness examination    -  Primary    Overall health status was reviewed.  Good health habits reinforced.  Annual wellness exams recommended.    Screening for prostate cancer        Prostate specific antigen blood test obtained was essentially normal, suggesting that there is no current concern for prostate cancer.  Digital exam deferred.                    Follow up in about 26 weeks (around 5/12/2025) for Extended chronic condition F/up.    This note was created with the assistance of [employer] voice recognition software or phone dictation. There may be transcription errors as a result of using this technology. Effort has been made to assure accuracy of transcription but any obvious errors or omissions should be clarified with the author of the document.

## 2024-11-10 NOTE — ASSESSMENT & PLAN NOTE
Component Ref Range & Units 1 mo ago  (9/24/24) 7 mo ago  (3/18/24) 1 yr ago  (9/21/23) 1 yr ago  (8/7/23) 1 yr ago  (12/13/22)   Hemoglobin A1c <=7.0 % 6.5 6.7 6.2 R 7.4 High  R, CM 6.6   Estimated Average Glucose mg/dL 139.9 145.6   142.7

## 2024-11-11 ENCOUNTER — OFFICE VISIT (OUTPATIENT)
Dept: PRIMARY CARE CLINIC | Facility: CLINIC | Age: 59
End: 2024-11-11
Payer: COMMERCIAL

## 2024-11-11 VITALS
OXYGEN SATURATION: 99 % | HEART RATE: 88 BPM | HEIGHT: 67 IN | WEIGHT: 175 LBS | SYSTOLIC BLOOD PRESSURE: 137 MMHG | BODY MASS INDEX: 27.47 KG/M2 | DIASTOLIC BLOOD PRESSURE: 88 MMHG | RESPIRATION RATE: 18 BRPM

## 2024-11-11 DIAGNOSIS — Z00.00 WELLNESS EXAMINATION: Primary | ICD-10-CM

## 2024-11-11 DIAGNOSIS — R79.89 LOW TESTOSTERONE: Chronic | ICD-10-CM

## 2024-11-11 DIAGNOSIS — Z82.49 FAMILY HISTORY OF EARLY CAD: Chronic | ICD-10-CM

## 2024-11-11 DIAGNOSIS — E11.9 TYPE 2 DIABETES MELLITUS WITHOUT COMPLICATION, WITHOUT LONG-TERM CURRENT USE OF INSULIN: Chronic | ICD-10-CM

## 2024-11-11 DIAGNOSIS — Z12.5 SCREENING FOR PROSTATE CANCER: ICD-10-CM

## 2024-11-11 DIAGNOSIS — I10 PRIMARY HYPERTENSION: Chronic | ICD-10-CM

## 2024-11-11 DIAGNOSIS — E78.5 DYSLIPIDEMIA: Chronic | ICD-10-CM

## 2024-11-11 PROBLEM — R20.2 PARESTHESIA OF UPPER EXTREMITY: Chronic | Status: RESOLVED | Noted: 2022-06-15 | Resolved: 2024-11-11

## 2024-11-11 RX ORDER — TAMSULOSIN HYDROCHLORIDE 0.4 MG/1
1 CAPSULE ORAL 2 TIMES DAILY
COMMUNITY
Start: 2024-07-25

## 2024-11-11 RX ORDER — LORAZEPAM 1 MG/1
1 TABLET ORAL 2 TIMES DAILY PRN
COMMUNITY
Start: 2024-07-22 | End: 2024-11-11

## 2024-12-12 ENCOUNTER — TELEPHONE (OUTPATIENT)
Dept: PRIMARY CARE CLINIC | Facility: CLINIC | Age: 59
End: 2024-12-12
Payer: COMMERCIAL

## 2024-12-12 NOTE — TELEPHONE ENCOUNTER
Pt notified about the shingles vaccine. Pt vocalized understanding to the information that was given. Pt stated his pharmacy texted him about the shingles vaccine. Pt wanted to know if it was a good idea to get the vaccine. He just didn't want the other previous vaccines to interact with this new one.

## 2024-12-12 NOTE — TELEPHONE ENCOUNTER
----- Message from Stephanie sent at 12/11/2024  4:14 PM CST -----  .Type:  Patient Returning Call    Who Called:pt  Who Left Message for Patient:pt  Does the patient know what this is regarding?:vaccine  Would the patient rather a call back or a response via [facility]?   Best Call Back Number:[phone]   Additional Information: Please call back about shingles vaccine

## 2025-01-14 ENCOUNTER — TELEPHONE (OUTPATIENT)
Dept: PRIMARY CARE CLINIC | Facility: CLINIC | Age: 60
End: 2025-01-14
Payer: COMMERCIAL

## 2025-01-14 NOTE — TELEPHONE ENCOUNTER
----- Message from Guevara sent at 1/14/2025 12:21 PM CST -----  .Who Called: Damian Ta    Caller is requesting assistance/information from provider's office.    Symptoms (please be specific): n/a   How long has patient had these symptoms:  n/a    List of preferred pharmacies on file (remove unneeded): [id number] - [place] - [address]   Phone: [phone]  Fax: [phone]    Preferred Method of Contact: Phone Call    Patient's Preferred Phone Number on File: [phone]     Best Call Back Number, if different:    Additional Information: Called stating pharmacy contacted pt and informed him his tirzepatide (MOUNJARO) 5 mg/0.5 mL PnIj is needing a PA. Please advise, thank you.

## 2025-01-15 ENCOUNTER — TELEPHONE (OUTPATIENT)
Dept: PRIMARY CARE CLINIC | Facility: CLINIC | Age: 60
End: 2025-01-15
Payer: COMMERCIAL

## 2025-01-15 NOTE — TELEPHONE ENCOUNTER
Called pt and he stated his insurance is still the same. Just his prescription card changed. Re did the Pa through [employer]. Pt stated everything must go through [employer] now

## 2025-01-15 NOTE — TELEPHONE ENCOUNTER
Please contact to see if he has a new insurance card or ask him to upload his current insurance card and medication insurance for us to complete his PA. Thanks

## 2025-02-03 DIAGNOSIS — E11.9 TYPE 2 DIABETES MELLITUS WITHOUT COMPLICATION, WITHOUT LONG-TERM CURRENT USE OF INSULIN: Primary | ICD-10-CM

## 2025-02-03 RX ORDER — METFORMIN HYDROCHLORIDE 500 MG/1
500 TABLET, EXTENDED RELEASE ORAL
Qty: 90 TABLET | Refills: 3 | Status: SHIPPED | OUTPATIENT
Start: 2025-02-03 | End: 2026-02-03

## 2025-02-03 NOTE — TELEPHONE ENCOUNTER
Pt called stating even though his Pa for Mounjaro was covered medication was still over 1,000 dollars and he cannot afford that. Pt asked if his previous rx can be called in for Metformin.

## 2025-05-05 ENCOUNTER — TELEPHONE (OUTPATIENT)
Dept: PRIMARY CARE CLINIC | Facility: CLINIC | Age: 60
End: 2025-05-05
Payer: COMMERCIAL

## 2025-05-05 DIAGNOSIS — E78.5 DYSLIPIDEMIA: Primary | ICD-10-CM

## 2025-05-05 RX ORDER — LOSARTAN POTASSIUM 50 MG/1
50 TABLET ORAL DAILY
Qty: 90 TABLET | Refills: 3 | Status: SHIPPED | OUTPATIENT
Start: 2025-05-05 | End: 2026-05-05

## 2025-05-05 NOTE — TELEPHONE ENCOUNTER
Copied from CRM #[id number]. Topic: Medications - Medication Refill  >> May 5, 2025 12:12 PM Anu wrote:  .Who Called: Damian Ta    Refill or New Rx:Refill  RX Name and Strength:losartan (COZAAR)   How is the patient currently taking it? (ex. 1XDay):1x  Is this a 30 day or 90 day RX:90  Local or Mail Order:local   List of preferred pharmacies on file (remove unneeded): [id number] [place], [place]  If different Pharmacy is requested, enter Pharmacy information here including location and phone number:    Ordering Provider:lilly       Preferred Method of Contact: Phone Call  Patient's Preferred Phone Number on File: [phone]   Best Call Back Number, if different:  Additional Information:  losartan (COZAAR)

## 2025-05-09 ENCOUNTER — PATIENT OUTREACH (OUTPATIENT)
Facility: CLINIC | Age: 60
End: 2025-05-09

## 2025-06-12 NOTE — PROGRESS NOTES
"Subjective:       Patient ID: Damian Ta is a 59 y.o. male.    Chief Complaint: Follow-up    Patient presents to the clinic today for chronic condition follow-up.  Doing well, no specific complaints, tolerating all medications, reporting no significant side effects or problems.     Last wellness exam was 11/11/2024.     Chronic conditions being treated include but are not limited to primary hypertension, diabetes mellitus, dyslipidemia.        Review of Systems   Constitutional: Negative.  Negative for fatigue and fever.   HENT: Negative.  Negative for sore throat and trouble swallowing.    Eyes: Negative.  Negative for redness and visual disturbance.   Respiratory: Negative.  Negative for chest tightness and shortness of breath.    Cardiovascular: Negative.  Negative for chest pain.   Gastrointestinal: Negative.  Negative for abdominal pain, blood in stool and nausea.   Endocrine: Negative.  Negative for cold intolerance, heat intolerance and polyuria.   Genitourinary: Negative.    Musculoskeletal: Negative.  Negative for arthralgias, gait problem and joint swelling.   Integumentary:  Negative for rash. Negative.   Neurological: Negative.  Negative for dizziness, weakness and headaches.   Psychiatric/Behavioral: Negative.  Negative for agitation and dysphoric mood.            Patient Care Team:  Marco Alicia MD as PCP - General (Family Medicine)  Alicia Perera LPN as Care Coordinator  Duane Dash MD as Consulting Physician (Gastroenterology)  Garett Aviles MD as Consulting Physician (Urology)  Objective:   Visit Vitals  /85 (Patient Position: Sitting)   Pulse 96   Ht 5' 7" (1.702 m)   Wt 79.4 kg (175 lb)   BMI 27.41 kg/m²        Physical Exam  Constitutional:       Appearance: Normal appearance.   HENT:      Head: Normocephalic.      Mouth/Throat:      Mouth: Mucous membranes are moist.      Pharynx: Oropharynx is clear.   Eyes:      Conjunctiva/sclera: Conjunctivae normal.      Pupils: " Pupils are equal, round, and reactive to light.   Cardiovascular:      Rate and Rhythm: Normal rate and regular rhythm.      Heart sounds: Normal heart sounds.   Pulmonary:      Effort: Pulmonary effort is normal.      Breath sounds: Normal breath sounds.   Abdominal:      General: Abdomen is flat.      Palpations: Abdomen is soft.   Musculoskeletal:         General: Normal range of motion.      Cervical back: Neck supple.   Skin:     General: Skin is warm and dry.   Neurological:      General: No focal deficit present.      Mental Status: He is alert and oriented to person, place, and time.   Psychiatric:         Mood and Affect: Mood normal.         Behavior: Behavior normal.         Thought Content: Thought content normal.         Judgment: Judgment normal.           Lab Results   Component Value Date     (H) 09/24/2024     09/24/2024    K 3.8 09/24/2024     09/24/2024    CO2 26 09/24/2024    BUN 9.3 09/24/2024    CREATININE 0.91 09/24/2024    CALCIUM 9.6 09/24/2024    PROT 7.0 09/24/2024    ALBUMIN 4.1 09/24/2024    BILITOT 0.4 09/24/2024    ALKPHOS 70 09/24/2024    AST 42 (H) 09/24/2024    ALT 48 09/24/2024    EGFRNORACEVR >60 09/24/2024     Lab Results   Component Value Date    WBC 6.44 09/24/2024    RBC 4.70 09/24/2024    HGB 15.1 09/24/2024    HCT 42.6 09/24/2024    MCV 90.6 09/24/2024    RDW 12.3 09/24/2024     09/24/2024      Lab Results   Component Value Date    CHOL 151 09/24/2024    TRIG 113 09/24/2024    HDL 53 09/24/2024    LDL 75.00 09/24/2024    TOTALCHOLEST 3 09/24/2024     Lab Results   Component Value Date    TSH 1.138 09/24/2024     Lab Results   Component Value Date    HGBA1C 6.5 09/24/2024        Lab Results   Component Value Date    PSA 3.57 09/24/2024         Assessment:       ICD-10-CM ICD-9-CM   1. Primary hypertension  I10 401.9   2. Type 2 diabetes mellitus without complication, without long-term current use of insulin  E11.9 250.00   3. Dyslipidemia  E78.5  272.4   4. Family history of early CAD  Z82.49 V17.3   5. Class 1 obesity due to excess calories with serious comorbidity and body mass index (BMI) of 30.0 to 30.9 in adult  E66.811 278.00    E66.09 V85.30    Z68.30        Current Outpatient Medications   Medication Instructions    azelastine (ASTELIN) 137 mcg, Nasal, 2 times daily    losartan (COZAAR) 50 mg, Oral, Daily    metFORMIN (GLUCOPHAGE-XR) 500 mg, Oral, With breakfast    rosuvastatin (CRESTOR) 10 mg, Oral, Daily    tadalafiL (CIALIS) 5 mg, Oral, Daily PRN    tamsulosin (FLOMAX) 0.4 mg Cap 1 capsule, 2 times daily     Plan:     Problem List Items Addressed This Visit          Cardiac/Vascular    Dyslipidemia (Chronic)    Overview   Prescribed Crestor 10 mg daily.    Most recent cholesterol/lipid blood testing shows adequate control, continue current treatment plan, including prescription medications if prescribed, and/or diet high in fiber, low in saturated fats as discussed during clinic visit.    Medication will be continued at current dosage.         Relevant Orders    Lipid Panel    Primary hypertension - Primary (Chronic)    Overview   Prescribe losartan 50 mg daily.    Blood pressures appear to be adequately controlled with current treatment plan, including prescription blood pressure medication.  Medication(s) appear to be effective at current dosages, and are not causing any side effects or problems.  Continue medical management and continue home blood pressure checks.  Average blood pressures at home should be no greater than 130/80.  Patient instructed to contact the clinic if blood pressures become elevated at any point prior to next clinic visit.    Medication will be continued at the current dosage.         Relevant Orders    Comprehensive Metabolic Panel    Family history of early CAD (Chronic)    Overview   Family members with known history of coronary artery disease:    Cardiovascular risk factors that have been addressed and are being  "actively monitor and/or treated are as follows:    Hypertension, dyslipidemia, diabetes mellitus.            Endocrine    Class 1 obesity due to excess calories with serious comorbidity and body mass index (BMI) of 30.0 to 30.9 in adult (Chronic)    Overview   Weight loss, healthy dietary choices, increased activity/exercise are recommended.  To lose weight, it is generally recommended to restrict calories to approximately 1500 calories per day to lose 1 lb per week, and approximately 1200 calories per day to lose up to 2 lb per week.  Generally, this is a healthy amount of weight to lose, and an appropriate amount of time to lose this amount of weight.  Adding exercise will assist in losing weight, particularly aerobic exercise such as walking.  However, resistance training, or lifting weights" over time may assistant weight loss by increasing basal metabolic rate, or the rate at which your body burns calories during periods of inactivity.    Keep track of BMI (body mass index), below 25 is considered normal, over 25 but below 30 is considered overweight, anything over 30 is considered moderately obese, over 35 severely obese, and over 40 is characterized as morbidly obese.         Type 2 diabetes mellitus without complication, without long-term current use of insulin (Chronic)    Overview   Prescribed metformin  mg daily, Mounjaro 5 mg weekly.    Most recent hemoglobin A1c shows good control on diabetic treatment plan, including diabetic prescription medication.  Continue current treatment plan, medical regimen, and lifestyle modification as discussed at this visit and during previous visits.    Medication will be continued at current dosage.         Current Assessment & Plan   Current A1c level pending.         Relevant Orders    Hemoglobin A1C               Follow up in about 6 months (around 12/16/2025) for Annual  F/up.    This note was created with the assistance of [employer] voice recognition software or " phone dictation. There may be transcription errors as a result of using this technology. Effort has been made to assure accuracy of transcription but any obvious errors or omissions should be clarified with the author of the document.

## 2025-06-13 ENCOUNTER — OFFICE VISIT (OUTPATIENT)
Dept: PRIMARY CARE CLINIC | Facility: CLINIC | Age: 60
End: 2025-06-13

## 2025-06-13 VITALS
HEIGHT: 67 IN | DIASTOLIC BLOOD PRESSURE: 85 MMHG | HEART RATE: 96 BPM | SYSTOLIC BLOOD PRESSURE: 131 MMHG | BODY MASS INDEX: 27.47 KG/M2 | WEIGHT: 175 LBS

## 2025-06-13 DIAGNOSIS — I10 PRIMARY HYPERTENSION: Primary | Chronic | ICD-10-CM

## 2025-06-13 DIAGNOSIS — E11.9 TYPE 2 DIABETES MELLITUS WITHOUT COMPLICATION, WITHOUT LONG-TERM CURRENT USE OF INSULIN: Chronic | ICD-10-CM

## 2025-06-13 DIAGNOSIS — E78.5 DYSLIPIDEMIA: Chronic | ICD-10-CM

## 2025-06-13 DIAGNOSIS — E66.09 CLASS 1 OBESITY DUE TO EXCESS CALORIES WITH SERIOUS COMORBIDITY AND BODY MASS INDEX (BMI) OF 30.0 TO 30.9 IN ADULT: Chronic | ICD-10-CM

## 2025-06-13 DIAGNOSIS — Z82.49 FAMILY HISTORY OF EARLY CAD: Chronic | ICD-10-CM

## 2025-06-13 DIAGNOSIS — E66.811 CLASS 1 OBESITY DUE TO EXCESS CALORIES WITH SERIOUS COMORBIDITY AND BODY MASS INDEX (BMI) OF 30.0 TO 30.9 IN ADULT: Chronic | ICD-10-CM
